# Patient Record
Sex: FEMALE | Race: WHITE | Employment: FULL TIME | URBAN - METROPOLITAN AREA
[De-identification: names, ages, dates, MRNs, and addresses within clinical notes are randomized per-mention and may not be internally consistent; named-entity substitution may affect disease eponyms.]

---

## 2017-02-22 ENCOUNTER — HOSPITAL ENCOUNTER (OUTPATIENT)
Age: 37
Discharge: HOME | End: 2017-02-22
Attending: PSYCHIATRY & NEUROLOGY
Payer: COMMERCIAL

## 2017-02-22 ENCOUNTER — HOSPITAL ENCOUNTER (OUTPATIENT)
Dept: LAB | Age: 37
Discharge: HOME | End: 2017-02-22
Payer: COMMERCIAL

## 2017-02-22 DIAGNOSIS — G43.019 HEADACHE, COMMON MIGRAINE, INTRACTABLE: ICD-10-CM

## 2017-02-22 LAB
ERYTHROCYTE [SEDIMENTATION RATE] IN BLOOD: 44 MM/HR (ref 0–20)
FOLATE SERPL-MCNC: >20 NG/ML (ref 3.1–17.5)
TSH SERPL DL<=0.05 MIU/L-ACNC: 4.51 UIU/ML (ref 0.36–3.74)
VIT B12 SERPL-MCNC: 636 PG/ML (ref 211–911)

## 2017-02-22 PROCEDURE — 70551 MRI BRAIN STEM W/O DYE: CPT

## 2017-02-23 LAB
ACE SERPL-CCNC: 34 U/L (ref 14–82)
ANA TITR SER IF: NEGATIVE {TITER}
B BURGDOR IGG+IGM SER-ACNC: <0.91 ISR (ref 0–0.9)
T PALLIDUM AB SER QL IF: NON REACTIVE

## 2017-05-17 ENCOUNTER — DOCUMENTATION ONLY (OUTPATIENT)
Dept: SURGERY | Age: 37
End: 2017-05-17

## 2017-05-19 ENCOUNTER — OFFICE VISIT (OUTPATIENT)
Dept: SURGERY | Age: 37
End: 2017-05-19

## 2017-05-19 VITALS
HEIGHT: 68 IN | HEART RATE: 91 BPM | WEIGHT: 293 LBS | SYSTOLIC BLOOD PRESSURE: 136 MMHG | DIASTOLIC BLOOD PRESSURE: 84 MMHG | BODY MASS INDEX: 44.41 KG/M2 | RESPIRATION RATE: 20 BRPM | TEMPERATURE: 97.6 F

## 2017-05-19 DIAGNOSIS — R12 HEARTBURN: ICD-10-CM

## 2017-05-19 DIAGNOSIS — G93.2 PSEUDOTUMOR CEREBRI: ICD-10-CM

## 2017-05-19 DIAGNOSIS — E66.01 MORBID OBESITY DUE TO EXCESS CALORIES (HCC): Primary | ICD-10-CM

## 2017-05-19 RX ORDER — BISMUTH SUBSALICYLATE 262 MG
1 TABLET,CHEWABLE ORAL DAILY
COMMUNITY

## 2017-05-19 RX ORDER — ACETAZOLAMIDE 500 MG/1
CAPSULE, EXTENDED RELEASE ORAL
COMMUNITY
Start: 2017-05-04 | End: 2017-11-08 | Stop reason: ALTCHOICE

## 2017-05-19 RX ORDER — PREDNISOLONE ACETATE 10 MG/ML
SUSPENSION/ DROPS OPHTHALMIC
COMMUNITY
Start: 2017-05-10 | End: 2017-11-08 | Stop reason: ALTCHOICE

## 2017-05-19 RX ORDER — NABUMETONE 500 MG/1
TABLET, FILM COATED ORAL
COMMUNITY
Start: 2017-05-08 | End: 2017-11-08 | Stop reason: ALTCHOICE

## 2017-05-19 NOTE — PROGRESS NOTES
Pt ID confirmed    Weight Loss Metrics 5/19/2017 5/19/2017   Pre op / Initial Wt 397 -   Today's Wt - 397 lb   BMI - 60.36 kg/m2   Ideal Body Wt 143 -   Excess Body Wt 254 -   Goal Wt 194 -   Wt loss to date 0 -   % Wt Loss 0 -   80% .2 -       Body mass index is 60.36 kg/(m^2).

## 2017-05-19 NOTE — PROGRESS NOTES
Initial Consultation for Bariatric Surgery     Alex Taylor is a 39 y.o. female who comes into the office today for initial consultation for the surgical options for the treatment of morbid obesity. The patient initially identified obesity in childhood. Alex Taylor has tried a variety of unsupervised weight-loss attempts including unsupervised diets, Weight Watchers and shuakes, but has yet to meet with lasting success. Maximum weight lost on a diet is about 80 lbs, but that the weight always seems to return. Today, the patient is Height: 5' 8\" (172.7 cm) , Weight: (!) 180.1 kg (397 lb) for a BMI of Body mass index is 60.36 kg/(m^2). Renee Long Maximum weight is 430lbs. It is due to their severe obesity, which is further complicated by  GERD and pseudotumor cerebri and GERD that the patient is now seeking out bariatric surgery. Weight Loss Metrics 5/19/2017 5/19/2017   Pre op / Initial Wt 397 -   Today's Wt - 397 lb   BMI - 60.36 kg/m2   Ideal Body Wt 143 -   Excess Body Wt 254 -   Goal Wt 194 -   Wt loss to date 0 -   % Wt Loss 0 -   80% .2 -       Body mass index is 60.36 kg/(m^2). Past Medical History:   Diagnosis Date    Arthritis     Pseudotumor cerebri        Past Surgical History:   Procedure Laterality Date    HX APPENDECTOMY      HX HEENT      T and A       Current Outpatient Prescriptions   Medication Sig Dispense Refill    nabumetone (RELAFEN) 500 mg tablet       prednisoLONE acetate (PRED FORTE) 1 % ophthalmic suspension       acetaZOLAMIDE SR (DIAMOX) 500 mg capsule       MEDROXYPROGESTERONE ACETATE (DEPO-PROVERA IM) by IntraMUSCular route.  Cetirizine (ZYRTEC) 10 mg cap Take  by mouth.  multivitamin (ONE A DAY) tablet Take 1 Tab by mouth daily.  calcium-cholecalciferol, d3, (CALCIUM 600 + D) 600-125 mg-unit tab Take  by mouth.          Allergies   Allergen Reactions    Tetracycline Other (comments)     Increases symptoms of pseudo tumor       Social History Substance Use Topics    Smoking status: Former Smoker     Quit date: 2/19/2017    Smokeless tobacco: None    Alcohol use Yes      Comment: socially   smokes on occasion, last one in February    No family history on file.     ROS, positive where in bold:    General: fevers, chills, night sweats, fatigue, weight loss, weight gain    GI: abdominal pain, nausea, vomiting, change in bowel habits, hematochezia, melena, GERD, constipation    Integumentary: dermatitis or abnormal moles, rashes and swelling of pretibial area, rosacea    HEENT:  visual changes (under care of Dr Darius Mccormack for possible autoimmune disorder), floaters/ blurriness, vertigo, epistaxis, dysphagia, hoarseness    Cardiac: chest pain, palpitations, hypertension, edema, varicosities    Resp:  cough, shortness of breath, wheezing, hemoptysis, snoring, reactive airway disease    : hematuria, dysuria, frequency, urgency, nocturia, stress urinary incontinence    MSK: weakness, joint pain, arthritis    Endocrine: diabetes, thyroid disease, polyuria, polydipsia, polyphagia, poor wound healing, heat intolerance,cold intolerance    Lymph/Heme: anemia, bruising, history of blood transfusions    Neuro: dizziness, headache, fainting, seizures, stroke, pseudotumor cerebri    Psychiatry:  anxiety, depression, psychosis      Physical Exam:  Visit Vitals    /84    Pulse 91    Temp 97.6 °F (36.4 °C)    Resp 20    Ht 5' 8\" (1.727 m)    Wt (!) 180.1 kg (397 lb)    BMI 60.36 kg/m2       General: Well developed, well nourished 39 y.o. female in no acute distress  ENMT: normocephalic, atraumatic mouth:clear, no overt lesions, oral mucosa pink and moist  Neck: supple, no masses, no adenopathy or carotid bruits, trachea midline  Skin: warm, smooth, dry and well perfused  Respiratory: clear to auscultation bilaterally, no wheeze, rhonchi or rales, excursions normal and symmetrical  Cardiovascular: Regular rate and rhythm, no murmurs, clicks, gallops or rubs, no edema or varicosities  Gastrointestinal: soft, nontender, nondistended, normoactive bowel sounds, no hernias, no hepatosplenomegaly, minimally palpable costal margins, mixed distribution  Musculoskeletal: warm, well-perfused, no tenderness or swelling, normal gait/station  Neuro: sensation and strength grossly intact and symmetrical  Psych: alert and oriented to person, place and time      Impression:    Kole Bailey is a 39 y.o. female who is suffering from morbid obesity and its attendant comorbidities who would benefit from bariatric surgery. We have had an extensive discussion with regard to the risks, benefits and likely outcomes of both the sleeve and the gastric bypass. With regard to bypass, we have discussed the risks including bleeding, infection, need for reoperation, damage to surrounding structure, anastomotic leak, gastrogastric fistula ulcer and stricture, bowel obstruction due to internal hernia or adhesions, DVT, PE, heart attack, stroke and death. Patient also understands risks of inadequate weight loss, excess weight loss, vitamin insufficiency, protein malnutrition, excess skin, and loss of hair. We've discussed the restrictive nature of the sleeve gastrectomy. The patient understands the likelihood of losing approximately 65% of their excess weight in 12 to 18 months. Patient also understands the risks including but not limited to bleeding, infection, need for reoperation, leaks from staple line and strictures, bowel obstruction secondary to adhesions, DVT, PE, heart attack, stroke, and death. Patient also understands risks of inadequate weight loss, excess weight loss, vitamin insufficiency, protein malnutrition, excess skin, and loss of hair.          We have reviewed the components of a successful postoperative course including requirement for a high protein, low carbohydrate diet, 60 oz a day of zero calorie liquids, daily vitamin supplementation, daily exercise, regular follow-up, and participation in support groups. At this time we will enroll the patient in our bariatric program, undertake routine laboratory and radiographic evaluation, EKG, evaluation by nutritionist as well as psychologist and pending their satisfactory completion of the preop evaluation, plan to perform either a sleeve gastrectomy or a gastric bypass.

## 2017-05-24 ENCOUNTER — TELEPHONE (OUTPATIENT)
Dept: SURGERY | Age: 37
End: 2017-05-24

## 2017-05-24 LAB — UREA BREATH TEST QL: NEGATIVE

## 2017-05-24 NOTE — TELEPHONE ENCOUNTER
Patient emailed stating she would prefer the 9:30 class at [facility] instead of the 10:30 classes at [facility]. I have added her to [facility] class on 6/7 at 9:30. Email copied below. From: Benjamín Organ [mailto:[email or web address]  > Sent: Monday, May 22, 2017 3:51 PM  > To: Stana Harada  > Subject: Re: #ExtMail# Nutrition Classes  >   > Jessica President, whatever 9:30 classes you can do would be awesome, the venue doesn't matter. The only week I won't be in town is June26-30, so any other dates would be great. I really appreciate your help with this matter!  >   > -Alla  >   > Sent from my iPhone    On May 23, 2017, at 11:06 AM, Stana Harada [email or web address] wrote:  >   > Good morning,  >   > The 9:30 class is Wednesday 6/7 at the [facility] office. Please let me know if this works and I will cancel the classes scheduled at [facility].  >   > Thanks,  > Sunny Gonzalez    -----Original Message-----  From: Benjamín Organ [mailto:[email or web address]   Sent: Tuesday, May 23, 2017 3:22 PM  To: Stana Harada  Subject: Re: #ExtMail# Nutrition Classes    Perfect! Thank you so much!     Sincerely,  Pablo Walters from my iPhone

## 2017-06-06 DIAGNOSIS — E66.01 MORBID OBESITY WITH BMI OF 60.0-69.9, ADULT (HCC): Primary | ICD-10-CM

## 2017-06-07 ENCOUNTER — DOCUMENTATION ONLY (OUTPATIENT)
Dept: BARIATRICS/WEIGHT MGMT | Age: 37
End: 2017-06-07

## 2017-06-07 ENCOUNTER — HOSPITAL ENCOUNTER (OUTPATIENT)
Dept: BARIATRICS/WEIGHT MGMT | Age: 37
Discharge: HOME | End: 2017-06-07

## 2017-06-07 NOTE — PROGRESS NOTES
[address] [place] [facility] [address]    Patient's Name: Clifton Figueroa   Age: 39 y.o. YOB: 1980   Sex: female    Date:   6/7/2017    Insurance:            Session: 1 of 6  Revision:   Surgeon:  Dr. Chris Clarke    Height: 5 f 8 Weight:    395      Lbs. BMI: 60.2   Pounds Lost since last month: 2               Pounds Gained since last month: 0    Starting Weight: 397   Previous Months Weight: 397  Overall Pounds Lost: 2 Overall Pounds Gained: 0      Do you smoke? NOne    Alcohol intake:  Number of drinks at a time:  1  Number of times a month: 1-2    Class Guidelines    Guidelines are reviewed with patient at the start of every class. 1. Patient understands that weight loss trial classes must be consecutive. Patient understands if they miss a class, it is their responsibility to contact me to reschedule class. I will reach out to patient after their first no show. 2.  Patient understands the expectations that weight maintenance/weight loss is expected during the classes. Failure to demonstrate changes may result in one extra month of weight loss trial, followed by going back to see the surgeon. 3. Patient is also instructed to be doing their labs, blood work, psych visit, support group and any other test that the surgeon has used while they are working on their weight loss trial.    Other Pertinent Information:     Changes Made Since Last Class: Lessn  Alcohol. Trying to eat more times per day. Eating Habits and Behaviors      Today we spent some time talking about the key diet principles. Patient was given ideas of protein-based snacks including deli meat, low fat cheese, yogurt, hummus and vegetables, protein drink, or a small handful of nuts. Patient was instructed to start cutting out the empty carbohydrate-based snacks that include chips, cookies, pretzels, crackers.     We talked about carbohydrates and starting to count daily carbohydrate intake to keep less than 100 grams per day. Patient is encouraged to fill up on meat and vegetables only. Patient was given a goal of 64 ounces of fluid a day. This needs to be non-carbonated, no caffeine, and no sugary drinks. Patient's current diet habits include: 2-3 meals per day. States she is rarely snacking. She is eating bread 3-4 x a week. She is eating sushi weekly. She states she rarely eats cookies, cake, pasta. She states she is struggling with a high carbohydrate diet. She is eating baked and grilled food. She is drinking 6-90 ounces of fluid per day. She is drinking 1-2 x a week, 32 ounces of sweet tea, but understands that is something she needs to cut out. Physical Activity/Exercise    Comments:  During class, I discussed with patient the importance of getting into an exercise routine. We talked about how strength training can help one's metabolism  Currently, patient is not doing anything for activity, states she is having knees issues. .  Goals have been set. I have encouraged patient to purchase a pedometer to track their steps. Behavior Modification       Comments: In class, I did a power point on The 100-200 Mindless Margin. Studies show that the average person will not know if they ate 100 or 200 more or less calories than usual.  This is called the Mindless Margin. In other words, one can eat up to 20% fewer calories and not activate the deprivation and craving mechanism in the brain. Patient was instructed to make a modest daily 100-200 calorie reduction in certain things that the body won't notice. One, 100-200 calorie change and you will lose 10-20 pounds in a year. We talked about strategies that may help including Food rules: I will not eat the whole dessert, but rather just 4 bites to satisfy me (pre-op).       Patient was given a check off list with a month's worth of days across the top and was encouraged to come up with a food, exercise, and behavior goal.  Every evening if the goal was achieved, they get a check in the box. The goal is for it to be filled with check marks.       One 100-calorie change that the patient is going to make is: Eating 3 x a day    Maddison Macario Spencer 87 RD  6/7/2017

## 2017-06-13 ENCOUNTER — HOSPITAL ENCOUNTER (OUTPATIENT)
Dept: LAB | Age: 37
Discharge: HOME | End: 2017-06-13
Payer: COMMERCIAL

## 2017-06-13 ENCOUNTER — HOSPITAL ENCOUNTER (OUTPATIENT)
Dept: GENERAL RADIOLOGY | Age: 37
Discharge: HOME | End: 2017-06-13
Payer: COMMERCIAL

## 2017-06-13 DIAGNOSIS — E66.01 MORBID OBESITY WITH BMI OF 60.0-69.9, ADULT (HCC): ICD-10-CM

## 2017-06-13 DIAGNOSIS — H30.92 LEFT POSTERIOR UVEITIS: ICD-10-CM

## 2017-06-13 LAB
ATRIAL RATE: 79 BPM
CALCULATED P AXIS, ECG09: 6 DEGREES
CALCULATED R AXIS, ECG10: 49 DEGREES
CALCULATED T AXIS, ECG11: 11 DEGREES
DIAGNOSIS, 93000: NORMAL
P-R INTERVAL, ECG05: 152 MS
Q-T INTERVAL, ECG07: 404 MS
QRS DURATION, ECG06: 102 MS
QTC CALCULATION (BEZET), ECG08: 463 MS
VENTRICULAR RATE, ECG03: 79 BPM

## 2017-06-13 PROCEDURE — 71020 XR CHEST PA LAT: CPT

## 2017-06-16 ENCOUNTER — TELEPHONE (OUTPATIENT)
Dept: SURGERY | Age: 37
End: 2017-06-16

## 2017-07-13 ENCOUNTER — HOSPITAL ENCOUNTER (OUTPATIENT)
Dept: BARIATRICS/WEIGHT MGMT | Age: 37
Discharge: HOME | End: 2017-07-13

## 2017-07-13 ENCOUNTER — DOCUMENTATION ONLY (OUTPATIENT)
Dept: BARIATRICS/WEIGHT MGMT | Age: 37
End: 2017-07-13

## 2017-07-13 NOTE — PROGRESS NOTES
[address] [facility] [address], [address]    Patient's Name: Alonso Schroeder   Age: 39 y.o. YOB: 1980   Sex: female    Date:   7/13/2017    Insurance:            Session: 2 of 6  Revision:   Surgeon:  Dr. Medina Dockery    Height: 5 f 8 Weight:    393      Lbs. BMI: 59.8   Pounds Lost since last month: 2               Pounds Gained since last month: 0    Starting Weight: 397   Previous Months Weight: 395  Overall Pounds Lost: 4 Overall Pounds Gained: 0      Do you smoke? NOne    Alcohol intake:  Number of drinks at a time:  1-2  Number of times a week:  < 2    Class Guidelines    Guidelines are reviewed with patient at the start of every class. 1. Patient understands that weight loss trial classes must be consecutive. Patient understands if they miss a class, it is their responsibility to contact me to reschedule class. I will reach out to patient after their first no show. 2.  Patient understands the expectations that weight maintenance/weight loss is expected during the classes. Failure to demonstrate changes may result in one extra month of weight loss trial, followed by going back to see the surgeon. 3. Patient is also instructed to be doing their labs, blood work, psych visit, support group and any other test that the surgeon has used while they are working on their weight loss trial.    Other Pertinent Information:     Changes Made Since Last Class: Protein shake daily. 2-3 meals per day. Eliminated sugary coffee drinks. Eating Habits and Behaviors      Today we reviewed key diet principles. Tips when eating out was discussed with patient including avoiding sandwiches to help reduce the carbohydrate intake, avoid drinking liquid calories, and try to split an entree. We talked about limiting the frequency of eating out. We also talked about carbohydrates.   Patient was encouraged to keep their carbohydrates under 100 grams while in weight loss trial classes and try to focus on meat and vegetables only. Patient was also encouraged to aim for 64 ounces of fluid per day without any liquid calories. Patient's current diet habits include: 2-3 meals per day. Snacking on string cheese and greek yogurt. She states she is trying to watch her carbohydrate intake and may have pretzels 2-3 x a week. She is drinking 16-32 ounces of fluid per day. She was encouraged to keep sipping to work towards 64 ounces per day. Physical Activity/Exercise    Comments:  During class, I gave a presentation called the Power of Walking. This presentation discussed all the benefits of walking, including decreasing the risk of developing diabetes by 30%. Patient was also made aware that losing 10 pounds of weight can relieve 40 pounds of pressure on the knees. Patient was encouraged to purchase a pedometer and use that as a tracker to increase their steps. The goals is ultimately 10,000 steps per day, but working towards that or using a pedometer to challenge themselves is a great self-motivator. Currently for exercise, patient is doing a 10 minute walk per day, 3-5 x a week. Behavior Modification       Comments: In class, we also focused on some behavior modifications. These include not eating in front of the TV, which sometimes leads to mindless munching. Shared with patients the study about people who were given 15 day old popcorn and despite the popcorn being 15days old, participants still ate it all because they were distracted by the movie and were not listening to physical cues with their body. I have also encouraged patient to start using a baby spoon or fork to eat their meals to help slow down the pace.       Maddison Bennett 87 RD  7/13/2017

## 2017-07-30 ENCOUNTER — HOSPITAL ENCOUNTER (EMERGENCY)
Age: 37
Discharge: HOME | End: 2017-07-30
Attending: EMERGENCY MEDICINE
Payer: COMMERCIAL

## 2017-07-30 VITALS
DIASTOLIC BLOOD PRESSURE: 98 MMHG | TEMPERATURE: 98.9 F | HEART RATE: 88 BPM | BODY MASS INDEX: 43.4 KG/M2 | RESPIRATION RATE: 20 BRPM | WEIGHT: 293 LBS | OXYGEN SATURATION: 96 % | SYSTOLIC BLOOD PRESSURE: 147 MMHG | HEIGHT: 69 IN

## 2017-07-30 DIAGNOSIS — R03.0 ELEVATED BLOOD PRESSURE READING: ICD-10-CM

## 2017-07-30 DIAGNOSIS — S61.219A LACERATION OF FINGER, INITIAL ENCOUNTER: Primary | ICD-10-CM

## 2017-07-30 PROCEDURE — 75810000293 HC SIMP/SUPERF WND  RPR

## 2017-07-30 PROCEDURE — 99281 EMR DPT VST MAYX REQ PHY/QHP: CPT

## 2017-07-30 PROCEDURE — 77030031132 HC SUT NYL COVD -A

## 2017-07-30 PROCEDURE — 77030018836 HC SOL IRR NACL ICUM -A

## 2017-07-30 NOTE — ED PROVIDER NOTES
HPI Comments: Patient is a 40 y/o female who presents to the ER c/o laceration to the right index finger. Patient states she was drying her dishes, when she cut her finger with a knife. She is up to date on tetanus. She is right hand dominant. Patient states she was unable to stop the bleeding with direct pressure after 20 minutes. She denied any anticoagulation meds. No other symptoms or complaints. The history is provided by the patient. Past Medical History:   Diagnosis Date    Arthritis     Pseudotumor cerebri        Past Surgical History:   Procedure Laterality Date    HX APPENDECTOMY      HX HEENT      T and A         History reviewed. No pertinent family history. Social History     Social History    Marital status: UNKNOWN     Spouse name: N/A    Number of children: N/A    Years of education: N/A     Occupational History    Not on file. Social History Main Topics    Smoking status: Former Smoker     Quit date: 2/19/2017    Smokeless tobacco: Never Used    Alcohol use Yes      Comment: socially    Drug use: No    Sexual activity: Not on file     Other Topics Concern    Not on file     Social History Narrative         ALLERGIES: Tetracycline    Review of Systems   Constitutional: Negative for chills, fatigue and fever. HENT: Negative. Negative for sore throat. Eyes: Negative. Respiratory: Negative for cough and shortness of breath. Cardiovascular: Negative for chest pain and palpitations. Gastrointestinal: Negative for abdominal pain, nausea and vomiting. Genitourinary: Negative for dysuria. Musculoskeletal: Negative. Skin: Positive for wound. Index to right index   Neurological: Negative for dizziness, weakness, light-headedness and headaches. Psychiatric/Behavioral: Negative. All other systems reviewed and are negative.       Vitals:    07/30/17 1904   BP: (!) 147/98   Pulse: 88   Resp: 20   Temp: 98.9 °F (37.2 °C)   SpO2: 96%   Weight: (!) 179.2 kg (395 lb)   Height: 5' 9\" (1.753 m)            Physical Exam   Constitutional: She is oriented to person, place, and time. She appears well-developed and well-nourished. No distress. HENT:   Head: Normocephalic and atraumatic. Mouth/Throat: Oropharynx is clear and moist.   Eyes: Conjunctivae are normal. No scleral icterus. Neck: Neck supple. No JVD present. No tracheal deviation present. Cardiovascular: Normal rate, regular rhythm and normal heart sounds. Pulmonary/Chest: Effort normal and breath sounds normal. No respiratory distress. She has no wheezes. Abdominal: Soft. There is no tenderness. Musculoskeletal: Normal range of motion. Hands:  Neurological: She is alert and oriented to person, place, and time. She has normal strength. Gait normal. GCS eye subscore is 4. GCS verbal subscore is 5. GCS motor subscore is 6. Skin: Skin is warm and dry. She is not diaphoretic. Psychiatric: She has a normal mood and affect. Nursing note and vitals reviewed. MDM  Number of Diagnoses or Management Options  Diagnosis management comments: 7:03 PM  40 y/o female c/o right index finger laceration onset prior to coming to the ER. Will plan on cleansing wound and appropriate closure. Daniel Ba PA-C    7:26 PM  Pt tolerated wound closure well. Band aid placed. Advised pt to return in 10-12 days for suture removal.  All questions answered and patient in agreement with plan of care. Will plan for discharge. Daniel Ba PA-C    Clinical Impression:  Right index finger laceration, elevated blood pressure reading    One or more blood pressure readings were noted elevated during the Pt's presentation in the emergency department this date. This abnormal reading has been cited in the Pt's diagnosis, and they have been encouraged to follow up with their primary care physician, or referred to a consultant for further evaluation and treatment.        Risk of Complications, Morbidity, and/or Mortality  Presenting problems: low  Diagnostic procedures: low  Management options: low    Patient Progress  Patient progress: stable    ED Course       Wound Repair  Date/Time: 7/30/2017 7:24 PM  Performed by: [id number] provider: igor  Preparation: skin prepped with Shur-Clens  Pre-procedure re-eval: Immediately prior to the procedure, the patient was reevaluated and found suitable for the planned procedure and any planned medications. Time out: Immediately prior to the procedure a time out was called to verify the correct patient, procedure, equipment, staff and marking as appropriate. .  Location details: right index finger  Wound length:2.5 cm or less (2.5)  Anesthesia: local infiltration    Anesthesia:  Anesthesia: local infiltration  Local Anesthetic: lidocaine 1% without epinephrine   Anesthetic total: 2 mL  Foreign bodies: no foreign bodies  Skin closure: 4-0 nylon  Number of sutures: 2  Technique: simple  Approximation: close  Dressing: band aid. Patient tolerance: Patient tolerated the procedure well with no immediate complications  My total time at bedside, performing this procedure was 1-15 minutes. Vitals:  Patient Vitals for the past 12 hrs:   Temp Pulse Resp BP SpO2   07/30/17 1904 98.9 °F (37.2 °C) 88 20 (!) 147/98 96 %         Medications ordered:   Medications - No data to display      Lab findings:  No results found for this or any previous visit (from the past 12 hour(s)). EKG interpretation by ED Physician:      X-Ray, CT or other radiology findings or impressions:  No orders to display       Progress notes, Consult notes or additional Procedure notes:       Reevaluation of patient:       Disposition:  Diagnosis:   1. Laceration of finger, initial encounter    2.  Elevated blood pressure reading        Disposition: Discharged    Follow-up Information     Follow up With Details Comments [facility]  If symptoms worsen [address] [facility] [address] [id number]  [address] [facility] EMERGENCY DEPT In 10 days For suture removal [place] [id number] [phone]           Patient's Medications   Start Taking    No medications on file   Continue Taking    ACETAZOLAMIDE SR (DIAMOX) 500 MG CAPSULE        CALCIUM-CHOLECALCIFEROL, D3, (CALCIUM 600 + D) 600-125 MG-UNIT TAB    Take  by mouth. CETIRIZINE (ZYRTEC) 10 MG CAP    Take  by mouth. MEDROXYPROGESTERONE ACETATE (DEPO-PROVERA IM)    by IntraMUSCular route. MULTIVITAMIN (ONE A DAY) TABLET    Take 1 Tab by mouth daily.     NABUMETONE (RELAFEN) 500 MG TABLET        PREDNISOLONE ACETATE (PRED FORTE) 1 % OPHTHALMIC SUSPENSION       These Medications have changed    No medications on file   Stop Taking    No medications on file

## 2017-07-30 NOTE — DISCHARGE INSTRUCTIONS

## 2017-08-02 ENCOUNTER — HOSPITAL ENCOUNTER (OUTPATIENT)
Dept: LAB | Age: 37
Discharge: HOME | End: 2017-08-02
Payer: COMMERCIAL

## 2017-08-02 DIAGNOSIS — E66.01 MORBID OBESITY WITH BMI OF 60.0-69.9, ADULT (HCC): ICD-10-CM

## 2017-08-02 LAB
ALBUMIN SERPL BCP-MCNC: 3.4 G/DL (ref 3.4–5)
ALBUMIN/GLOB SERPL: 1 {RATIO} (ref 0.8–1.7)
ALP SERPL-CCNC: 77 U/L (ref 45–117)
ALT SERPL-CCNC: 19 U/L (ref 13–56)
ANION GAP BLD CALC-SCNC: 9 MMOL/L (ref 3–18)
AST SERPL W P-5'-P-CCNC: 11 U/L (ref 15–37)
BASOPHILS # BLD AUTO: 0 K/UL (ref 0–0.06)
BASOPHILS # BLD: 0 % (ref 0–2)
BILIRUB SERPL-MCNC: 0.3 MG/DL (ref 0.2–1)
BUN SERPL-MCNC: 19 MG/DL (ref 7–18)
BUN/CREAT SERPL: 32 (ref 12–20)
CALCIUM SERPL-MCNC: 8.6 MG/DL (ref 8.5–10.1)
CHLORIDE SERPL-SCNC: 109 MMOL/L (ref 100–108)
CHOLEST SERPL-MCNC: 135 MG/DL
CO2 SERPL-SCNC: 23 MMOL/L (ref 21–32)
CREAT SERPL-MCNC: 0.59 MG/DL (ref 0.6–1.3)
DIFFERENTIAL METHOD BLD: ABNORMAL
EOSINOPHIL # BLD: 0.2 K/UL (ref 0–0.4)
EOSINOPHIL NFR BLD: 2 % (ref 0–5)
ERYTHROCYTE [DISTWIDTH] IN BLOOD BY AUTOMATED COUNT: 14.7 % (ref 11.6–14.5)
EST. AVERAGE GLUCOSE BLD GHB EST-MCNC: NORMAL MG/DL
FERRITIN SERPL-MCNC: 66 NG/ML (ref 8–388)
FOLATE SERPL-MCNC: 18.6 NG/ML (ref 3.1–17.5)
GLOBULIN SER CALC-MCNC: 3.3 G/DL (ref 2–4)
GLUCOSE SERPL-MCNC: 87 MG/DL (ref 74–99)
HBA1C MFR BLD: 4.9 % (ref 4.2–5.6)
HCT VFR BLD AUTO: 37.5 % (ref 35–45)
HDLC SERPL-MCNC: 37 MG/DL (ref 40–60)
HDLC SERPL: 3.6 {RATIO} (ref 0–5)
HGB BLD-MCNC: 12.1 G/DL (ref 12–16)
IRON SERPL-MCNC: 28 UG/DL (ref 50–175)
LDLC SERPL CALC-MCNC: 81.6 MG/DL (ref 0–100)
LIPID PROFILE,FLP: ABNORMAL
LYMPHOCYTES # BLD AUTO: 20 % (ref 21–52)
LYMPHOCYTES # BLD: 2 K/UL (ref 0.9–3.6)
MCH RBC QN AUTO: 28.1 PG (ref 24–34)
MCHC RBC AUTO-ENTMCNC: 32.3 G/DL (ref 31–37)
MCV RBC AUTO: 87 FL (ref 74–97)
MONOCYTES # BLD: 0.5 K/UL (ref 0.05–1.2)
MONOCYTES NFR BLD AUTO: 5 % (ref 3–10)
NEUTS SEG # BLD: 7.4 K/UL (ref 1.8–8)
NEUTS SEG NFR BLD AUTO: 73 % (ref 40–73)
PLATELET # BLD AUTO: 273 K/UL (ref 135–420)
PMV BLD AUTO: 10.7 FL (ref 9.2–11.8)
POTASSIUM SERPL-SCNC: 4.1 MMOL/L (ref 3.5–5.5)
PROT SERPL-MCNC: 6.7 G/DL (ref 6.4–8.2)
RBC # BLD AUTO: 4.31 M/UL (ref 4.2–5.3)
SODIUM SERPL-SCNC: 141 MMOL/L (ref 136–145)
TRIGL SERPL-MCNC: 82 MG/DL (ref ?–150)
TSH SERPL DL<=0.05 MIU/L-ACNC: 3.73 UIU/ML (ref 0.36–3.74)
VIT B12 SERPL-MCNC: 569 PG/ML (ref 211–911)
VLDLC SERPL CALC-MCNC: 16.4 MG/DL
WBC # BLD AUTO: 10.1 K/UL (ref 4.6–13.2)

## 2017-08-02 PROCEDURE — 82728 ASSAY OF FERRITIN: CPT | Performed by: SURGERY

## 2017-08-02 PROCEDURE — 80053 COMPREHEN METABOLIC PANEL: CPT | Performed by: SURGERY

## 2017-08-02 PROCEDURE — 84443 ASSAY THYROID STIM HORMONE: CPT | Performed by: SURGERY

## 2017-08-02 PROCEDURE — 82306 VITAMIN D 25 HYDROXY: CPT | Performed by: SURGERY

## 2017-08-02 PROCEDURE — 80061 LIPID PANEL: CPT | Performed by: SURGERY

## 2017-08-02 PROCEDURE — 85025 COMPLETE CBC W/AUTO DIFF WBC: CPT | Performed by: SURGERY

## 2017-08-02 PROCEDURE — 82607 VITAMIN B-12: CPT | Performed by: SURGERY

## 2017-08-02 PROCEDURE — 84425 ASSAY OF VITAMIN B-1: CPT | Performed by: SURGERY

## 2017-08-02 PROCEDURE — 83540 ASSAY OF IRON: CPT | Performed by: SURGERY

## 2017-08-02 PROCEDURE — 83036 HEMOGLOBIN GLYCOSYLATED A1C: CPT | Performed by: SURGERY

## 2017-08-02 PROCEDURE — 36415 COLL VENOUS BLD VENIPUNCTURE: CPT | Performed by: SURGERY

## 2017-08-04 LAB — VIT B1 BLD-SCNC: 183.1 NMOL/L (ref 66.5–200)

## 2017-08-08 LAB
25(OH)D2 SERPL-MCNC: <1 NG/ML
25(OH)D3 SERPL-MCNC: 22 NG/ML
25(OH)D3+25(OH)D2 SERPL-MCNC: 22 NG/ML

## 2017-08-08 NOTE — PROGRESS NOTES
8/8/17:  Spoke to patient regarding her Vitamin D 3 levels. Patient understood the instructions of starting 5000 IU of Vitamin D 3 per day. I also spoke to patient regarding her iron levels. Normal . Patient's levels were 28. Patient was instructed to take 65 mg of iron BID with Vitamin C to help absorption. She was 3 more months with me in [facility]. Jesica, can you please put an order in [facility] for her to retest iron in 6-8 weeks. Thank you.     Jelani Bishop, MS, RD

## 2017-08-10 ENCOUNTER — OFFICE VISIT (OUTPATIENT)
Dept: SURGERY | Age: 37
End: 2017-08-10

## 2017-08-10 VITALS
SYSTOLIC BLOOD PRESSURE: 151 MMHG | WEIGHT: 293 LBS | RESPIRATION RATE: 20 BRPM | TEMPERATURE: 96.9 F | HEIGHT: 69 IN | BODY MASS INDEX: 43.4 KG/M2 | HEART RATE: 94 BPM | DIASTOLIC BLOOD PRESSURE: 98 MMHG

## 2017-08-10 DIAGNOSIS — E66.01 MORBID OBESITY DUE TO EXCESS CALORIES (HCC): Primary | ICD-10-CM

## 2017-08-10 RX ORDER — CHOLECALCIFEROL TAB 125 MCG (5000 UNIT) 125 MCG
TAB ORAL DAILY
COMMUNITY

## 2017-08-10 RX ORDER — LANOLIN ALCOHOL/MO/W.PET/CERES
CREAM (GRAM) TOPICAL
COMMUNITY
End: 2020-01-13 | Stop reason: ALTCHOICE

## 2017-08-10 NOTE — PROGRESS NOTES
Juliane Quigley is a 39 y.o. female who presents today with   Chief Complaint   Patient presents with    Morbid Obesity     Mid trial     Body mass index is 57.74 kg/(m^2). 1. Have you been to the ER, urgent care clinic since your last visit? Hospitalized since your last visit? No    2. Have you seen or consulted any other health care providers outside of the [address] [place] [address] since your last visit? Include any pap smears or colon screening.  No

## 2017-08-10 NOTE — PROGRESS NOTES
Bariatric Preoperative Progress note:    Subjective:     Nelsy Isaac is a 39 y.o. female who presents today for followup of their candidacy for bariatric surgery. Since last seen, has been working to cut carbs and eat breakfast.  She is also trying to walk 3-4 days a week. She notes that she is limited by her knee pain. She is packing her lunch and trying to cook more. She cut her finger cooking 10 days ago and had three stitches placed in her right index finger. She would like to have those removed today was well. She has decided she would like to pursue gastric bypass. Past Medical History:   Diagnosis Date    Arthritis     Pseudotumor cerebri        Past Surgical History:   Procedure Laterality Date    HX APPENDECTOMY      HX HEENT      T and A        Current Outpatient Prescriptions   Medication Sig Dispense Refill    cholecalciferol, VITAMIN D3, (VITAMIN D3) 5,000 unit tab tablet Take  by mouth daily.  ferrous sulfate (IRON) 325 mg (65 mg iron) tablet Take  by mouth Daily (before breakfast).  acetaZOLAMIDE SR (DIAMOX) 500 mg capsule       MEDROXYPROGESTERONE ACETATE (DEPO-PROVERA IM) by IntraMUSCular route.  multivitamin (ONE A DAY) tablet Take 1 Tab by mouth daily.  calcium-cholecalciferol, d3, (CALCIUM 600 + D) 600-125 mg-unit tab Take  by mouth.  nabumetone (RELAFEN) 500 mg tablet       prednisoLONE acetate (PRED FORTE) 1 % ophthalmic suspension       Cetirizine (ZYRTEC) 10 mg cap Take  by mouth.          Allergies   Allergen Reactions    Tetracycline Other (comments)     Increases symptoms of pseudo tumor         Objective:     Physical Exam:  Visit Vitals    BP (!) 151/98 (BP 1 Location: Right arm, BP Patient Position: At rest)    Pulse 94    Temp 96.9 °F (36.1 °C) (Oral)    Resp 20    Ht 5' 9\" (1.753 m)    Wt (!) 177.4 kg (391 lb)    BMI 57.74 kg/m2       General: Well developed, well nourished 39 y.o. female in no acute distress  ENMT: normocephalic, atraumatic mouth:clear, no overt lesions, oral mucosa pink and moist  Neck: supple, no masses, no adenopathy or carotid bruits, trachea midline  Skin: warm, smooth, dry and well perfused  Respiratory: clear to auscultation bilaterally, no wheezes, rhonchi or rales, excursions normal and symmetrical  Cardiovascular: Regular rate and rhythm, no murmurs, clicks, gallops or rubs, no edema or varicosities  Gastrointestinal: soft, nontender, nondistended, normoactive bowel sounds, no hernias, no hepatosplenomegaly  Musculoskeletal: warm, well-perfused, no tenderness or swelling, normal gait/station  Neuro: sensation and strength grossly intact and symmetrical  Psych: alert and oriented to person, place and time      Studies to date:    Labs: significant for low Vit D and iron, has been directed in supplementation    EKG: no significant abnormality    CXR: no significant abnormality    Nutritional evaluation: ongoing    Psychiatric evaluation:pending, re-evaluation scheduled for 8/18/17        Assessment:   Maldonado Post is a 39 y.o. female who is progressing through the bariatric preoperative evaluation. At this time, they are an appropriate candidate for weight loss surgery. Plan:   -complete remainder of preop evaluation including remaining dietary and psychological evaluations  -Follow up once has completed entirety of weight loss workup to determine next steps.

## 2017-08-11 DIAGNOSIS — E61.1 LOW IRON: Primary | ICD-10-CM

## 2017-08-16 ENCOUNTER — DOCUMENTATION ONLY (OUTPATIENT)
Dept: BARIATRICS/WEIGHT MGMT | Age: 37
End: 2017-08-16

## 2017-08-16 ENCOUNTER — HOSPITAL ENCOUNTER (OUTPATIENT)
Dept: BARIATRICS/WEIGHT MGMT | Age: 37
Discharge: HOME | End: 2017-08-16

## 2017-08-16 NOTE — PROGRESS NOTES
[address] [facility] [address], [address]    Patient's Name: Sherol Hatchet   Age: 39 y.o. YOB: 1980   Sex: female    Date:   8/16/2017    Insurance:            Session: 3 of  6  Revision:   Surgeon:  Dr. Deretha Frankel    Height: 5 f 8 Weight:    393      Lbs. BMI: 59.9   Pounds Lost since last month: 0               Pounds Gained since last month: 0    Starting Weight: 397   Previous Months Weight: 397  Overall Pounds Lost: 4 Overall Pounds Gained: 0      Do you smoke? None    Alcohol intake:  Number of drinks at a time:  1  Number of times a week: 1-2    Class Guidelines    Guidelines are reviewed with patient at the start of every class. 1. Patient understands that weight loss trial classes must be consecutive. Patient understands if they miss a class, it is their responsibility to contact me to reschedule class. I will reach out to patient after their first no show. 2.  Patient understands the expectations that weight maintenance/weight loss is expected during the classes. Failure to demonstrate changes may result in one extra month of weight loss trial, followed by going back to see the surgeon. 3. Patient is also instructed to be doing their labs, blood work, psych visit, support group and any other test that the surgeon has used while they are working on their weight loss trial.    Other Pertinent Information:     Changes Made Since Last Class: Meal preparing. 2.5 weeks of eating 100 grams of carbohydrates or less. Eating Habits and Behaviors      Today we reviewed fernandez diet principles. The main objective was focusing on carbohydrates. Patient was also given ideas of foods that are high in carbohydrates and foods that are lower in carbohydrates. Patient was instructed to stick to meat and vegetables only and try to keep their daily carbohydrate intake less than 100 grams per day.       During class, I also gave a power point on Water and Caffeine. In this presentation, we talked about the importance and how not getting enough fluid can impact their weight loss. Patient understands that 64 ounces is the minimum amount of fluid. We talked about the best sources of fluid choices. Patient was also given a list of common drinks and the amount of calories in these drinks, such as 250 calories in a large sweet teas, 300-500 calories in a smoothie, and 400 calories in some coffee drinks. We talked about signs and symptoms of dehydration. Patient was also given tips on how to wean from caffeine. Patient's current diet habits include: She states in the last 2 weeks, she has been eating out more than usual.  She states she has had a lot of meetings outside of work. Patient understands she needs to work on planning ahead of time, so she doesn't run into these situations that she is just grabbing something or eating out. Physical Activity/Exercise    Comments:     Currently for exercise, patient is walking 3-5 x a week. We talked about activities for patient to do, including walking, swimming, or chair exercises. Behavior Modification       Comments: In class, we also focused on some behavior modifications. Patient was instructed to aim for 3 meals a day and eating within 1 hour of waking up. Patient is encouraged not to go longer than 3-4 hours between meals, but if physical hunger occurs to go for a protein-based snack that we discussed. We also spent some time about the importance of planning ahead. Patient was encouraged to start thinking about what they were going to eat throughout the week for meals, so they can grocery shop, and be well-prepared. Lack of planning is often what creates havoc on a healthy lifestyle change. Assessment:    X  Due to the weight gain since last month, I spent some time talking with patient 1-1 before class.   Patient contributed some of the weight gain to eating out more. Some goals that we discussed include: She is going to make an effort to plan ahead.     Anh Londono, MS RD  8/16/2017

## 2017-09-14 ENCOUNTER — HOSPITAL ENCOUNTER (OUTPATIENT)
Dept: BARIATRICS/WEIGHT MGMT | Age: 37
Discharge: HOME | End: 2017-09-14

## 2017-09-14 ENCOUNTER — DOCUMENTATION ONLY (OUTPATIENT)
Dept: BARIATRICS/WEIGHT MGMT | Age: 37
End: 2017-09-14

## 2017-09-14 NOTE — PROGRESS NOTES
[address] [facility] [address] [facility], [address]    Patient's Name: Mladonado Post   Age: 39 y.o. YOB: 1980   Sex: female    Date:   9/14/2017    Insurance:            Session: 4 of 6  Revision:   Surgeon:  Dr. Filipe Whitlock    Height: 5 f 8 Weight:    395      Lbs. BMI: 60.2   Pounds Lost since last month: 0               Pounds Gained since last month: 2    Starting Weight: 397   Previous Months Weight: 393  Overall Pounds Lost: 2 Overall Pounds Gained: 0      Do you smoke? None    Alcohol intake:  Number of drinks at a time:  1  Number of times a week: < 1    Class Guidelines    Guidelines are reviewed with patient at the start of every class. 1. Patient understands that weight loss trial classes must be consecutive. Patient understands if they miss a class, it is their responsibility to contact me to reschedule class. I will reach out to patient after their first no show. 2.  Patient understands the expectations that weight maintenance/weight loss is expected during the classes. Failure to demonstrate changes may result in one extra month of weight loss trial, followed by going back to see the surgeon. 3. Patient is also instructed to be doing their labs, blood work, psych visit, support group and any other test that the surgeon has used while they are working on their weight loss trial.    Other Pertinent Information:     Changes Made Since Last Class: Sticking with low carb. Minimized drinking. Eating Habits and Behaviors      Today we reviewed fernandez diet principles. Patient was instructed to stop all liquid calories and was given a list of fluid choices that would be appropriate. We talked about carbohydrates. Patient was educated that all carbohydrates turn to sugar and was encouraged to stick to the complex carbohydrates while in weight loss trials, but aim to keep less than 100 grams during weight loss trials. Patient was given a list of foods to not eat and drink due to high sugar, high fat, or high carbohydrate content. Patient was also given a list of foods and drinks that are high protein, low carbohydrate, and would be appropriate to eat. Patient was given a list of fruit and what a portion size is and how many carbohydrates it contains. Patient was encouraged to keep fruit intake to a minimum. Patient was also given a list of 50 low carbohydrate snack options, but was also cautioned that some of the choices still were high in calories and portion control should be practiced. Patient's current diet habits include: 3 meals per day. States she does most of her snacking in the evening, but did not specify what she is snacking on. She is eating bread and rice 1 x a week. She is struggling the most with prepping her meals. She is drinking 64 ounces of fluid per day. She is eating baked and grilled food and is using a standard size plate. She has reduced her wine intake to 12 ounces per day. Physical Activity/Exercise    Comments:     Currently for exercise, patient is walking 15-20 minutes, 3-4 x a week. We talked about activities for patient to do, including walking, swimming, or chair exercises. Behavior Modification       Comments:   Patient was encouraged to food journal. I talked with patient about tracking their daily carbohydrate. One helpful isac is My Fitness Pal.  Patient was also encouraged to track their daily fluid intake. Discussed with patient the isac, Water Logged, that can be helpful in tracking their fluid intake. We also talked about the importance of planning ahead. Lack of willpower is often a lack of planning. Assessment:    Patient is getting frustrated and has been set up for a 1-1 next month.     Herb Frankel, Luite Spencer 87 RD  9/14/2017

## 2017-10-11 ENCOUNTER — HOSPITAL ENCOUNTER (OUTPATIENT)
Dept: BARIATRICS/WEIGHT MGMT | Age: 37
Discharge: HOME | End: 2017-10-11

## 2017-10-11 ENCOUNTER — DOCUMENTATION ONLY (OUTPATIENT)
Dept: BARIATRICS/WEIGHT MGMT | Age: 37
End: 2017-10-11

## 2017-10-11 NOTE — PROGRESS NOTES
Nutrition  Weight Loss Trial    Patient's Name: Hieu Sesay   Age: 39 y.o. YOB: 1980   Sex: female  Visit #: 5 of 6  Insurance:   Weight Loss Trial Insurance Required or Registered Dietitian Required:     Starting Weight with surgeon: 397  Height: 5 f 8 Weight: 390 BMI:        Other  Smoking Status:  None. Quit in February. Alcohol intake: Quit drinking last month. Changes made to prepare: No alcohol. Patient states she was previously drinking a lot of wine. She states she has been doing more meal prep on Saturday and Sundays. She states she is doing better about focusing on meal time and not eating on the go and eating in her car. She is now eating at a table. She states she has only eaten out one time. Dietary Instruction    Number of Meals Consumed per day: 3    Diet History: Gets up at 7:30 am.  She will eat on her way to work. She will do a protein shake. She will do little egg muffins if she is out of shakes. Patient states she will have a string cheese half way between her morning. She has established a new rule. She can't eat what she doesn't pack. Lunch:  Cucumber salad with tuna and light finley and Rg mustard or lentil salad. Previously, she may have gotten a salad, but she was eating out a lot. Sometimes between lunch-dinner:  May do a protein bar. She travels a lot for work. Dinner: Patient states she has been having an omelet with vegetables. Trigger Foods: Ice cream, pretzels, and wine. States she hasn't been buying them and is no longer drinking wine. Timing of Snacks: Protein bars, string cheese    Patient feels they have a good understanding of label reading: Focusing on carbohydrates     Patient's meal choices tend to be: Lower in carbohydrates, higher in protein    Preparation of patient's meals tend to be:  Baked and grilled     Dining Outside Home:  4-5 times in the last month, but this was closer to her last visit with me for fast food, 1 times a month for a sit down restaurant, and 1 times a month  for carry out. Other: This has improved significantly since the last time. Fluid Intake: 100 ounces of water per day, 0 ounces of  diet soda per day, 0 ounces of sweet tea per day, 0 ounces of fruit juices per day, 0 fruit smoothie frequency, 0 ounces of coffee, other:     Food Intake:  None this month for ice cream, 1 handful of MM's 2.5 weeks ago times a  for candy (hard, chocolate, etc), 0 times a  for cake, cookies, donuts, sweet rolls. Food Intake:  1 time in the last month for rice, 0 times a  for cereal, 0 times a for bread, 0 times a  for chips, pretzels, or other salty, crunchy snacks. Comments: She states she realized she wasn't being true to the program and really wasn't following the protocol. She states she has been planning her meals, stopped wine, and is no longer eating out. Physical Activity/Exercise      Comments: Patient states she is unpacking because she just moved. She is doing a lot of walking up and down her stairs. She states she hasn't been walking as much because she's been unpacking. Goals for Activity:  Once she is unpacked, she plans to go back to working. Behavior Modification    Emotional Eating is an issue for patient:  More out of boredom. She states she is trying to look for other outlets. Where does patient eat their meals: At a table    Patient is eating or snacking after 7 pm: Sometimes, but is trying to be more mindful if it's more hunger or boedom    Length of time to eat a meal: 15 minutes      Actions Plan:  Visit # 6    Goals for next month:    1. Continue with meal preparation   2. She feels that she is cutting her portions while doing the meal preparation. 3. Continue with walking for 30 minutes daily. 4. Continue with changes she has made.       Maddison Pandey Spencer 87 RD  10/11/2017

## 2017-10-30 ENCOUNTER — TELEPHONE (OUTPATIENT)
Dept: SURGERY | Age: 37
End: 2017-10-30

## 2017-10-30 NOTE — TELEPHONE ENCOUNTER
FIDENCIOM to schedule pt for a pre op appointment with Dr. Ingrid Augustin and a tentative surgery date for November.

## 2017-11-01 NOTE — TELEPHONE ENCOUNTER
Patient called back and she wanted to be scheduled at [facility] instead of [facility] and she wanted to be scheduled in [facility] December. She is scheduled for 12/11/17. She has her final wlt class with Gm Yeager next week, she has been scheduled for a pre op class at [facility].

## 2017-11-08 ENCOUNTER — OFFICE VISIT (OUTPATIENT)
Dept: SURGERY | Age: 37
End: 2017-11-08

## 2017-11-08 VITALS
TEMPERATURE: 98.6 F | RESPIRATION RATE: 20 BRPM | HEIGHT: 69 IN | BODY MASS INDEX: 43.4 KG/M2 | SYSTOLIC BLOOD PRESSURE: 136 MMHG | WEIGHT: 293 LBS | DIASTOLIC BLOOD PRESSURE: 80 MMHG | HEART RATE: 88 BPM

## 2017-11-08 DIAGNOSIS — E66.01 MORBID OBESITY DUE TO EXCESS CALORIES (HCC): Primary | ICD-10-CM

## 2017-11-08 NOTE — PROGRESS NOTES
Tacey Boas is a 39 y.o. female who comes into the office today after completing the entirety of the bariatric preoperative protocol satisfactorily. She has been struggling with obesity since childhood. She has tried a variety of unsupervised weight-loss attempts, but has yet to meet with lasting success. Today, the patient is Height: 5' 9\" (175.3 cm) tall, Weight: (!) 172.8 kg (381 lb) lbs for a Body mass index is 56.26 kg/(m^2). It is due to their severe obesity, which is further complicated by GERD and pseudotumor cerebri  that the patient is now seeking out bariatric surgery, specifically, the gastric bypass. Past Medical History:   Diagnosis Date    Arthritis     Pseudotumor cerebri        Past Surgical History:   Procedure Laterality Date    HX APPENDECTOMY      HX HEENT      T and A       Current Outpatient Prescriptions   Medication Sig Dispense Refill    cholecalciferol, VITAMIN D3, (VITAMIN D3) 5,000 unit tab tablet Take  by mouth daily.  ferrous sulfate (IRON) 325 mg (65 mg iron) tablet Take  by mouth Daily (before breakfast).  MEDROXYPROGESTERONE ACETATE (DEPO-PROVERA IM) by IntraMUSCular route.  Cetirizine (ZYRTEC) 10 mg cap Take  by mouth.  multivitamin (ONE A DAY) tablet Take 1 Tab by mouth daily.  calcium-cholecalciferol, d3, (CALCIUM 600 + D) 600-125 mg-unit tab Take  by mouth.  nabumetone (RELAFEN) 500 mg tablet       prednisoLONE acetate (PRED FORTE) 1 % ophthalmic suspension       acetaZOLAMIDE SR (DIAMOX) 500 mg capsule          Allergies   Allergen Reactions    Tetracycline Other (comments)     Increases symptoms of pseudo tumor       Social History   Substance Use Topics    Smoking status: Former Smoker     Quit date: 2/19/2017    Smokeless tobacco: Never Used    Alcohol use No       No family history on file.       ROS, positive where in bold:    General: fevers, chills, night sweats, fatigue, weight loss, weight gain    GI: abdominal pain, nausea, vomiting, change in bowel habits, hematochezia, melena, GERD    Integumentary: dermatitis or abnormal moles    HEENT:  visual changes, vertigo, epistaxis, dysphagia, hoarseness    Cardiac: chest pain, palpitations, hypertension, edema,  varicosities    Resp:  cough, shortness of breath, wheezing, hemoptysis, snoring, reactive airway disease    : hematuria, dysuria, frequency, urgency, nocturia, stress urinary incontinence    MSK: weakness, joint pain, arthritis    Endocrine: diabetes, thyroid disease, polyuria, polydipsia, polyphagia, poor wound healing, heat intolerance, cold intolerance    Lymph/Heme: anemia, bruising, history of blood transfusions    Neuro: dizziness, headache, fainting, seizures, stroke    Psychiatry:  Anxiety, depression, psychosis      Physical Exam:  Visit Vitals    /80    Pulse 88    Temp 98.6 °F (37 °C)    Resp 20    Ht 5' 9\" (1.753 m)    Wt (!) 172.8 kg (381 lb)    BMI 56.26 kg/m2       General: Well developed, well nourished 39 y.o. female in no acute distress  ENMT: normocephalic, atraumatic mouth:clear, no overt lesions, oral mucosa pink and moist  Neck: supple, no masses, no adenopathy or carotid bruits, trachea midline  Skin: warm, smooth, dry and well perfused  Respiratory: clear to auscultation bilaterally, no wheeze, rhonchi or rales, excursions normal and symmetrical  Cardiovascular: Regular rate and rhythm, no murmurs, clicks, gallops or rubs, no edema or varicosities  Gastrointestinal: soft, nontender, nondistended, normoactive bowel sounds, no hernias, no hepatosplenomegaly, minimally palpable costal margins,  mixed distribution  Musculoskeletal: warm, well-perfused, no tenderness or swelling, normal gait/station  Neuro: sensation and strength grossly intact and symmetrical  Psych: alert and oriented to person, place and time      Studies:    Labs: within normal limits except for low Vit D and iron (directed in supplementation)    EKG: without significant abnormalities    Nutritional evaluation has deemed a good candidate for weight loss surgery    Psychiatric evaluation has deemed a good candidate for weight loss surgery    Impression:    Luanne Singh is a 39 y.o. female who is suffering from morbid obesity and its attendant comorbidities who would benefit from bariatric surgery. We've discussed the restrictive and malabsorptive nature of the gastric bypass. The patient understands the likelihood of losing approximately 80% of their excess weight in 12 to 18 months. The patient also understands the risks including but not limited to bleeding, infection, need for reoperation, anastomotic ulcers, leaks and strictures, bowel obstruction secondary to adhesions and internal hernias, DVT, PE, heart attack, stroke, and death. Patient also understands risks of inadequate weight loss, excess weight loss, vitamin insufficiency, protein malnutrition, excess skin, and loss of hair. We have reviewed the components of a successful postoperative course including requirement for a high protein, low carbohydrate diet, 60 oz a day of zero calorie liquids, daily vitamin supplementation, daily exercise, regular follow-up, and participation in support groups. We have reviewed the preoperative clear liquid diet, as well as reviewed any medication changes required while on the clear liquid diet. We will schedule them for laparoscopic gastric bypass in the near future.

## 2017-11-08 NOTE — PROGRESS NOTES
Pt ID confirmed    Weight Loss Metrics 11/8/2017 11/8/2017 8/10/2017 8/10/2017 7/30/2017 5/19/2017 5/19/2017   Pre op / Initial Wt 381 - 391 - - 397 -   Today's Wt - 381 lb - 391 lb 395 lb - 397 lb   BMI - 56.26 kg/m2 - 57.74 kg/m2 58.33 kg/m2 - 60.36 kg/m2   Ideal Body Wt 146 - 146 - - 143 -   Excess Body Wt 235 - 245 - - 254 -   Goal Wt 193 - 195 - - 194 -   Wt loss to date 0 - 0 - - 0 -   % Wt Loss 0 - 0 - - 0 -   80%  - 196 - - 203.2 -       Body mass index is 56.26 kg/(m^2).

## 2017-11-09 ENCOUNTER — HOSPITAL ENCOUNTER (OUTPATIENT)
Dept: BARIATRICS/WEIGHT MGMT | Age: 37
Discharge: HOME | End: 2017-11-09

## 2017-11-09 ENCOUNTER — DOCUMENTATION ONLY (OUTPATIENT)
Dept: BARIATRICS/WEIGHT MGMT | Age: 37
End: 2017-11-09

## 2017-11-09 NOTE — PROGRESS NOTES
Nutrition Evaluation    Patient's Name: Hieu Sesay   Age: 39 y.o. YOB: 1980   Sex: female    Height: 5 f 8 Weight: 380 BMI:  57.9  Starting Weight:  397      Patient has completed a 6 month weight loss trial.  During the classes, we have focused on 3 components including diet, exercise, and behavior modifications. Upon completion of the weight loss trial, patient had a good understanding of the 3 components. The diet component of the weight loss trial emphasized on:   Label reading and keeping total fat and sugar less than 3 grams per serving    Proper portion sizes    Drinking 64 ounces of water per day   Cutting out simple sugar    The exercise component of the weight loss trial emphasized on:   The importance of getting into an exercise routine.  Ideas and goals for exercise were discussed with the patient. The behavior component of the weight loss trial emphasized on:   Taking 20-30 minutes to eat a meal.   Planning ahead to avoid making poor dietary choices.  Not eating in front of the TV or computer    Smoking Status:  None  Alcohol Intake:  Number of Drinks at a Time: NOne  Number of Times a Week: NOne    Changes made during classes include:  Packing and preparing meals  Less than 100 grams of carbohydrates per day  Eliminated alcohol        Two things that patient learned during this weight loss trial:  Dedication and Discipline is crucial  Major lifestyle change    Summary:  I feel that Hieu Sesay has demonstrated appropriate diet changes and is ready to move forward with surgery. Patient has been briefed on the importance of the protein drinks, vitamins, and the transition of the diet stages. Patient understands that the long-term diet will focus on protein and vegetables. Patient understand the effects of carbohydrates after surgery and what reactive hypoglycemia is.       Patient is aware that they will be attending pre-op class 2 weeks before surgery and will get more detailed information on the post-op diet guidelines. Patient will see me again at 6 weeks post-op. At this 6 week visit, RD will assess how patient is tolerating soft protein and advance to vegetables, if tolerating soft protein without difficulty. Patient will also see RD again at 9 months post-op. This visit will assess patient's compliance with current protocol, including diet, vitamins, protein shakes, and exercise. Post-op diet guidelines will be reinforced. RD is available for questions and to meet with patient outside of the 6 week and 9 month post-op visit.      Candidate for surgery: Yes  Re-evaluation Date:     Procedure:  Gastric Bypass     Dell Jeronimo MS RD  11/9/2017

## 2017-11-09 NOTE — PROGRESS NOTES
[address] [place] [facility] [address]    Patient's Name: Alice Warren   Age: 39 y.o. YOB: 1980   Sex: female    Date:   11/9/2017    Insurance:            Session: 6 of 6  Revision:   Surgeon:  Dr. Arvin Ricketts    Height: 5 f 8 Weight:    380      Lbs. BMI: 57.9   Pounds Lost since last month: 10               Pounds Gained since last month: 0    Starting Weight: 397   Previous Months Weight: 390  Overall Pounds Lost: 17 Overall Pounds Gained: 0      Do you smoke?  none    Alcohol intake:  Number of drinks at a time:  none  Number of times a week: none    Class Guidelines    Guidelines are reviewed with patient at the start of every class. 1. Patient understands that weight loss trial classes must be consecutive. Patient understands if they miss a class, it is their responsibility to contact me to reschedule class. I will reach out to patient after their first no show. 2.  Patient understands the expectations that weight maintenance/weight loss is expected during the classes. Failure to demonstrate changes may result in one extra month of weight loss trial, followed by going back to see the surgeon. 3. Patient is also instructed to be doing their labs, blood work, psych visit, support group and any other test that the surgeon has used while they are working on their weight loss trial.    Other Pertinent Information:     Changes Made Since Last Class: Continue meal prep and packing her lunch            Dietary Instruction    During today's class we continued to focus on the key diet principles. Patient was instructed to follow a low carbohydrate diet, focusing on meat and vegetables. Patient was instructed to stop liquid calories and aim for 64 ounces of water per day. In class, I also gave patient a power point on surviving the holidays.   Some of the tips included survival tips for parties, including bringing their own low carbohydrate dish to a potluck dinner and surveying the buffet line before they start filling up their plate. Patient was given cooking alternatives, including using Splenda for sugar, substituting applesauce for oil in recipes, and using low fat plain yogurt instead of sour cream in dips. Patient was also encouraged to be mindful of calories in alcohol. Patient's diet habits include: 3 meals a day. States she is focusing more on protein than she previously had been. She is snacking on string cheese, protein bar, yogurt, and sugar free jello. She is drinking 32-64 ounces of water per day. She states she no longer eats out and packs her lunch. Physical Activity/Exercise    Patient is currently doing walking for 15-30 minutes, 3 x a week for activity. Today's power point on surviving the holidays also included tips on exercising. This included being creative during the holiday, walking stairs, mall walking, getting resistance bands. Patient was encouraged not to be afraid to excuse themselves from the table to go for a walk after they eat. Comments:     Behavior Modification    Reinforced behavior changes to make. Patient was encouraged to keep their emotions in check. Try to HALT and focus on whether they are eating out of hunger or if they are eating out of emotions. Other eating behaviors included surveying the buffet line before starting to fill up their plate.    Patient was given a check off list and encouraged to monitor some of their eating behaviors, such as eating slowly, chewing their food thoroughly, and taking 20-30 minutes to eat a meal.    Goals that patient set for next month include:  Smaller portions   Less cheese      Cynthia Bob, MS RD  11/9/2017

## 2017-12-04 ENCOUNTER — DOCUMENTATION ONLY (OUTPATIENT)
Dept: MEDSURG UNIT | Age: 37
End: 2017-12-04

## 2017-12-04 NOTE — PROGRESS NOTES
Patient attended pre op class with Jacqueline and [place]. Patient was educated on all pre op instructions below. A copy was provided. All questions were answered  7 day Pre-Op LIQUID Diet    Benefits:  o Reducing intake before surgery will shrink your liver by  depleting glycogen. (a form of stored energy)  o Reduced liver size gives better access to stomach during  surgery; which translates to a safer surgery. o Prevents the \"last supper\" syndrome  o Experiencing weight loss before the procedure encourages  post-operative compliance and \"jump-starts\" weight loss    Specifics:  o Start 7 days before surgery:  ____________  o NO SOLID FOODS!!  o Your surgery will be CANCELED if this diet is not followed!!!  o Minimum of 64oz. of fluid daily, including protein drinks.  o No added sugar or carbonated beverages  o Continue to take all your prescribed medication and your vitamin supplements during this preoperative diet phase. CLEAR LIQUIDS:   Water   Sugar free, non-carbonated beverages (crystal light, propel)   Sugar free popsicles   Sugar free Jell-O   Fat free, reduced sodium broth    Decaffeinated coffee or decaffeinated tea with artificial sweeteners. PROTEIN:   60 grams of protein daily (in liquid supplements)   Pre-made protein drinks   Protein powder added to water    3 gram rule: limit sugar and fat to less than 3 grams per 8oz.  4-6 oz. low fat/low sugar yogurt OR cottage cheese 3-4 times during the week      Following Gastric Bypass surgery you will no longer be able to take NSAIDs (Non-Steriodal Anti-Inflammatory Drugs) EVER again. NSAIDs are the leading cause of stomach ulcers following Gastric Bypass surgery. (Patients having the Gastric Sleeve will not be able to take NSAIDs for 6 weeks following surgery.)      MOST COMMONLY TAKEN    NSAIDs    to be AVOIDED!! 1. Ibuprofen  2. Advil  3. Motrin  4. Excedrin  5. Aspirin  6. Celebrex  7. Naproxen  8. Aleve  9. Voltaren  10. Mobic    Attached is an extensive list of additional NSAIDs that cannot be taken following surgery. Please be sure to review this list when you are being prescribed new medications. This list covers the majority of NSAIDs on the market. Be aware that additional NSAIDs do exist and new medications are being introduced regularly. You must be your own advocate!! Non-Steriodal Anti-Inflammatory Drugs (NSAIDs)  ? The following is a list of NSAIDS that are NEVER to be taken again after Gastric Bypass surgery    Ibuprofen which is also known as : Advil, Advil Childrens, Advil Jose Strength, Advil Liquigel, Advil Migraine, Advil Pediatric, Childrens Ibuprofen Berry, Genpril, IBU, Midol IB, Midol Maximum Strength Cramp Formula, Dolgesic, Motrin Childrens, Motrin IB, Motrin Infant Drops, Motrin Jose Strength, Motrin Migraine Pain, Nuprin, Migraine Liqui-gels, Ibu-Tab 200, Cap-Profen, Tab-Profen, Profen, Ibuprohm, Childrens Elixsure, IB Pro, Vicoprofen, Combunox, A-G Profen, Actiprofen, Addaprin, Advil Infants Concentrated Drops, Caldolor, [place], Q-Profen, Ibifon 600, Ibren, [place], Midol Cramps & Bodyaches, [place], Nidia, [place], [place], Uni-Pro, Wal-Profen    Aspirin which has the brand name: Arthritis Pain, Aspergum, Aspir-Low, Aspirin Lite Coat, Lindsey Aspirin, Bufferin, Easprin, Ecotrin, Empirin, Fasprin, [place], Halfprin, [place] Aspirin, [place] Aspirin, Excedrin    Ketoprofen which is known as: Orudis KT, Oruvail, Actron. Sulindac which is sold as: Clinoril.     Naproxen is one of the most common NSAIDs, and is sold as: Aleve, Naprosyn, EC-Naprosyn, Naprelan, Anaprox, All Day Pain Relief, Aflaxen, All Day Relief, Anaprox-DS, Comfort Pac  With Naproxen,  Aleve Caplet, Aleve Easy Open Arthritis, Aleve Gelcap,  Anaprox-DS, EC-Naprosyn, Leader Naproxen Sodium, Midol Extended Relief, Naprelan 375?, Naprelan 500?, Naprelan 750?, Prevacid NapraPac 375,  Prevacid NapraPac, Naprapac, Vimovo. Etodolac is sold under the brand name: Lodine XL, Lodine. Fenoprofen can be found on the market as: Nalfon, Nalfon 200. Diclofenac sold as: Arthrotec, Cataflam, Voltaren, Cambia, Voltaren-XR, Zipsor. Flurbiprofen sold as: Asaid. Ketorolac is sold under the brand name: Sprix, Toradol, Toradol IM, Toradol IV/IM. Piroxicam is found on the market as: Feldene. Indomethacin known as: Indocin SR, Indocin, Indo-Shayy, Indomethegan, Indocin IV. Mefenamic Acid sold as: Ponstel. Meloxicam is sold under the brand name: Mobic    Nabumetone which is sold as: Relafen. Oxaprozin which has the brand name: Daypro    Ketoprofen which has the brand name: Actron, Orudis KT, Orudis, Oruvail    Famotidine and Ibuprofen can be found as: Duexis    Meclofenamate sold as: Meclomen    Tolmetin which can be found on the marked as: Tolectin, Tolectin 600, Tolectin DS    Salsalate which is sold as: Disalcid. Celecoxib which is sold as: Celebrex      Patients name: ________________________________  Date of surgery: ____________    Pre-op instructions:  1. Shower with the antibacterial soap  the 3 days prior to surgery. 2. Pre-admission testing will contact you 1 week before surgery  3. [facility] will contact you the day before surgery to confirm your surgery time.  Email or call your surgery scheduler if you have not heard from them by 3pm  4. Nothing to eat or drink (NPO) after midnight the night before surgery.  Take any evening medications by 11pm  5. Wear comfortable clothing. 6. Do not bring any valuables. 7. Bring insurance card, prescription card, photo ID and credit card to the hospital.  **Discuss all home medications with your surgeon at you pre-op appointment. Your surgeon will inform you of what medications to stop before surgery and what medications to take the day of surgery.     **STOP all NSAIDS (Ibuprofen, Aleve, Advil, Naprosyn etc.) and Aspirin 7 days before your surgery      Important Information:  1. No lifting over 15 lbs. for 6 weeks post-op  2. No driving for 1-14 days after surgery - must be off pain medication. 3. No smoking EVER again! 4. You will NOT be able to take any Non-Steroidal Anti-inflammatories (NSAIDS), Aspirin or Steroids  a. Bypass/revision patients - EVER again. (Tylenol only)  b. Sleeve patients - 6 weeks after surgery  5. Pulse/temperature- twice daily for 2 weeks post-op   6. Avoid pregnancy for 18 months  7. Key Diet principles:  a. Vitamin/mineral supplements-Stone Complete, Calcium citrate, Vitamin D3, Vitamin B12  b.  Protein supplements - 60-70 grams/day  c. Minimum 64 oz. fluid per day      What to Expect in the Hospital:  Pre-op area:  o Emergency door take elevator 2nd floor  o Arrive 1.5 hours before surgery  o Lovenox (blood thinner)  o Incentive Spirometer  o SCDs  o Sign consent  o Anesthesia  Start IV    Operating Room:    o Gastric bypass: 2- 2 ½ hours  o Sleeve gastrectomy: 1-1 ½ hours  o Revision: 2-3 hours    PACU(Post Anesthesia Care Unit):  o Nasal cannula  o EKG monitor  o Blood pressure cuff  o Pulse Ox  o Dietrich Catheter  o SCDs  o No family allowed  o Minimum one hour      2 Central (Day of Surgery):  o Private room  o 1-2 oz. ice chips per hour   o Pain Management ( Three Tier)  Tylenol given first- 650 mg PO  Oxycodone 5 mg PO   Dilaudid IV  Remember other alternatives to pain medication   Get patient out of bed and walking this helps tremendously  Position change or get patient up to the chair  Ice [place]kin to Abdomen  Guided Imagery  o Discontinue dietrich catheter  o Walk within 4 hours  o One family member can spend the night (must 18 years or older)  o Cell phone/computers - OK    2 Central (Post-op Day 1/Post-op Day 2):  o 7am - Gastrograffin swallow study (X-ray) for:  o All sleeve gastrectomy patients  o All revision patients   o Discontinue -nasal cannula and heart rate monitor  o Start bariatric clear liquid diet - water, crystal light, broth, Jell-O and protein supplement  o Slowly increase to 3 oz. clear liquids and 1 oz. protein supplement per hour  o Oral pain medication as needed for pain - communicate with your nurse  o Goal:  48 to 64 ounces, clear liquid per day preferable water  o Discharge instructions/Lovenox self-injection instructions   o WALK & WATER    Post-op Goals:  1. Void within 8 hours of your catheter being removed  2. Pain is well controlled with oral pain medication  3. Nausea is under control/No vomiting  4. Tolerating 3 oz. of clear liquids and 1 oz. protein supplement per hour for 3 consecutive hours. Post-op Follow-up Labs will need to be completed 1-2 weeks BEFORE your 3 month, 6 month and yearly visits. These labs are required and your appointment will be rescheduled if they are not completed. My surgical procedure and post-operative hospital stay has been discussed with me and I have been given the opportunity to ask any questions I may have. Patient Signature: ____________________________  Date: _____________________    THINGS I NEED TO KNOW BEFORE SURGERY  1. How many ounces of fluid will you need to drink every day after surgery? _______________    2. List 3 examples of bariatric clear liquids. ________________________  ________________________  ________________________  3. What is the 3 gram rule?   ______________________________________________________________      4. What is the 30 minute rule?  ______________________________________________________________      5. What are examples of food you will be able to eat on the bariatric soft and moist diet?  _________________________  _________________________  _________________________  6. After surgery I will be able to use a straw. TRUE    FALSE    7. After surgery I will NOT be able to drink carbonated beverages. TRUE    FALSE  8. After surgery I will be able to drink caffeine. TRUE   FALSE    9.  What 4 vitamins will you take after surgery?  ______________________           _________________________  _____________________           _________________________  10. How much exercise should you get daily? _________________    11. How long should it take you to eat a meal? ________________    12. The Calcium I have purchased is: ______________________. This has ________ mg per serving. I will need to take this ________ times a day. 13. The protein drink I have decided on is: ______________. This has _________ grams of protein. I will need to drink ______ of my protein drinks during the full liquid phase and _____ during the soft protein phase. My protein drinks will give me ______ ounces of fluid. 14. After having a sleeve gastrectomy I will not be able to take NSAIDs (Non-Steroidal Anti-inflammatory Drugs) for ______ weeks. 15. After having a gastric bypass I will not be able to take NSAIDs (Non-Steroidal Anti-Inflammatory Drugs) for _____________. PRE-OP CHECKLIST    THINGS TO DO AT MY PRE-OP APPOINTMENT WITH MY SURGEON:  ? Discuss ALL my current medications with my surgeon. Know what medications needs to be stopped before surgery AND what medications need to be taken on the morning of surgery. ? blood pressure/diuretic medications. ? Coumadin, Plavix or other blood thinners    ? Stop the following diabetic medications (if applicable): ____________________________________________________on: ______________  ? Use Regular Insulin (Novolog Pen) according to the following sliding scale: Insulin Sliding Scale  Blood sugar:  Amount of insulin:  Under 150  no insulin  150-200  2 units  201-250  4 units  251-300  6 units  301-350  8 units  351-400  10 units  400 or greater 12 units and call physician [phone]    THINGS TO DO FOLLOWING the PRE-OP CLASS:  ? Read through all information given to me by:  ? My dietitian Lg Wilson or Luís)  ? Nava/Jacqueline at the Pre-op class    ?  Contact my insurance company to find out the out of pocket cost of Lovenox (enoxaparin). $____________      THINGS TO DO BEFORE SURGERY:    ? Start the pre-op liquid diet on: ___________    ? Stop all NSAIDS (see attached sheet with list of NSAIDs) and Aspirin 7 days before my surgery: ___________  ? Contact my doctor(PCP or surgeon) regarding stopping Coumadin, Plavix or other blood thinners    ? Purchase bariatric clear liquids (Crystal Lite, sugar free Jell-O, broth, sugar free popsicles, protein supplement) and bariatric soft and moist foods (low fat yogurt, cottage cheese, eggs, tuna, fish, chicken) so that Im prepared when I get home from the hospital.     ? Purchase all vitamins that will be required following surgery. 1. Chewable multivitamin - 2 per day(ex: Flintstones)  2. Calcium Citrate - 1500mg (500mg 3 times a day)  3. Vitamin B12 - 1000mcg daily  4. Vitamin D3 - 5000IU daily    ? Create a system to keep track of how many ounces of fluid I am drinking daily. ? Post-op GOAL: 64oz per day    ? Purchase a protein supplement that I like:  ? Brand: ______________    ? Ounces: __________   ?  Grams of protein per serving: _________

## 2017-12-08 ENCOUNTER — ANESTHESIA EVENT (OUTPATIENT)
Dept: SURGERY | Age: 37
DRG: 621 | End: 2017-12-08
Payer: COMMERCIAL

## 2017-12-08 RX ORDER — FAMOTIDINE 20 MG/1
20 TABLET, FILM COATED ORAL ONCE
Status: CANCELLED | OUTPATIENT
Start: 2017-12-08 | End: 2017-12-08

## 2017-12-11 ENCOUNTER — HOSPITAL ENCOUNTER (INPATIENT)
Age: 37
LOS: 2 days | Discharge: HOME | DRG: 621 | End: 2017-12-13
Attending: SURGERY | Admitting: SURGERY
Payer: COMMERCIAL

## 2017-12-11 ENCOUNTER — ANESTHESIA (OUTPATIENT)
Dept: SURGERY | Age: 37
DRG: 621 | End: 2017-12-11
Payer: COMMERCIAL

## 2017-12-11 PROBLEM — E66.01 MORBID OBESITY DUE TO EXCESS CALORIES (HCC): Status: ACTIVE | Noted: 2017-12-11

## 2017-12-11 LAB — HCG UR QL: NEGATIVE

## 2017-12-11 PROCEDURE — 77030035045 HC TRCR ENDOSC VRSPRT BLDLSS COVD -B: Performed by: SURGERY

## 2017-12-11 PROCEDURE — 0D164ZA BYPASS STOMACH TO JEJUNUM, PERCUTANEOUS ENDOSCOPIC APPROACH: ICD-10-PCS | Performed by: SURGERY

## 2017-12-11 PROCEDURE — 77030008477 HC STYL SATN SLP COVD -A: Performed by: ANESTHESIOLOGY

## 2017-12-11 PROCEDURE — 74011250636 HC RX REV CODE- 250/636: Performed by: SURGERY

## 2017-12-11 PROCEDURE — 77030036598 HC CARTDRG STPL RELD ECHELON FLX J&J -D: Performed by: SURGERY

## 2017-12-11 PROCEDURE — 77030005515 HC CATH URETH FOL14 BARD -B: Performed by: SURGERY

## 2017-12-11 PROCEDURE — 81025 URINE PREGNANCY TEST: CPT

## 2017-12-11 PROCEDURE — 76210000016 HC OR PH I REC 1 TO 1.5 HR: Performed by: SURGERY

## 2017-12-11 PROCEDURE — 77030011640 HC PAD GRND REM COVD -A: Performed by: SURGERY

## 2017-12-11 PROCEDURE — 74011250636 HC RX REV CODE- 250/636

## 2017-12-11 PROCEDURE — 77030035051: Performed by: SURGERY

## 2017-12-11 PROCEDURE — 77030032490 HC SLV COMPR SCD KNE COVD -B: Performed by: SURGERY

## 2017-12-11 PROCEDURE — 77030027138 HC INCENT SPIROMETER -A

## 2017-12-11 PROCEDURE — 77030031139 HC SUT VCRL2 J&J -A: Performed by: SURGERY

## 2017-12-11 PROCEDURE — 77030027491 HC SHR ENDOSC COVD -B: Performed by: SURGERY

## 2017-12-11 PROCEDURE — 77030035050: Performed by: SURGERY

## 2017-12-11 PROCEDURE — 77030008683 HC TU ET CUF COVD -A: Performed by: ANESTHESIOLOGY

## 2017-12-11 PROCEDURE — 76060000036 HC ANESTHESIA 2.5 TO 3 HR: Performed by: SURGERY

## 2017-12-11 PROCEDURE — 74011000250 HC RX REV CODE- 250: Performed by: SURGERY

## 2017-12-11 PROCEDURE — 77030027876 HC STPLR ENDOSC FLX PWR J&J -G1: Performed by: SURGERY

## 2017-12-11 PROCEDURE — 65270000029 HC RM PRIVATE

## 2017-12-11 PROCEDURE — 77030008438 HC STPL REINF SEMGD WLGO -D: Performed by: SURGERY

## 2017-12-11 PROCEDURE — 77030036732 HC RELD STPLR VASC J&J -F: Performed by: SURGERY

## 2017-12-11 PROCEDURE — 77030020254 HC SOL INJ D5LR LACTATED RINGER

## 2017-12-11 PROCEDURE — 77030035029 HC NDL INSUF VERES DISP COVD -B: Performed by: SURGERY

## 2017-12-11 PROCEDURE — 74011000250 HC RX REV CODE- 250: Performed by: NURSE ANESTHETIST, CERTIFIED REGISTERED

## 2017-12-11 PROCEDURE — 76010000172 HC OR TIME 2.5 TO 3 HR INTENSV-TIER 1: Performed by: SURGERY

## 2017-12-11 PROCEDURE — 77030035048 HC TRCR ENDOSC OPTCL COVD -B: Performed by: SURGERY

## 2017-12-11 PROCEDURE — 77030013079 HC BLNKT BAIR HGGR 3M -A: Performed by: ANESTHESIOLOGY

## 2017-12-11 PROCEDURE — 77030018846 HC SOL IRR STRL H20 ICUM -A: Performed by: SURGERY

## 2017-12-11 PROCEDURE — 77030002933 HC SUT MCRYL J&J -A: Performed by: SURGERY

## 2017-12-11 PROCEDURE — 74011258636 HC RX REV CODE- 258/636: Performed by: SURGERY

## 2017-12-11 PROCEDURE — 74011250637 HC RX REV CODE- 250/637: Performed by: SURGERY

## 2017-12-11 PROCEDURE — 74011250636 HC RX REV CODE- 250/636: Performed by: NURSE ANESTHETIST, CERTIFIED REGISTERED

## 2017-12-11 PROCEDURE — 77030002996 HC SUT SLK J&J -A: Performed by: SURGERY

## 2017-12-11 PROCEDURE — 74011000250 HC RX REV CODE- 250

## 2017-12-11 PROCEDURE — 77030016151 HC PROTCTR LNS DFOG COVD -B: Performed by: SURGERY

## 2017-12-11 RX ORDER — FAMOTIDINE 20 MG/1
TABLET, FILM COATED ORAL
Status: DISPENSED
Start: 2017-12-11 | End: 2017-12-11

## 2017-12-11 RX ORDER — ONDANSETRON 2 MG/ML
4 INJECTION INTRAMUSCULAR; INTRAVENOUS EVERY 6 HOURS
Status: DISCONTINUED | OUTPATIENT
Start: 2017-12-11 | End: 2017-12-13 | Stop reason: HOSPADM

## 2017-12-11 RX ORDER — OXYCODONE HYDROCHLORIDE 5 MG/1
5 TABLET ORAL
Status: DISCONTINUED | OUTPATIENT
Start: 2017-12-11 | End: 2017-12-13 | Stop reason: HOSPADM

## 2017-12-11 RX ORDER — MIDAZOLAM HYDROCHLORIDE 1 MG/ML
INJECTION, SOLUTION INTRAMUSCULAR; INTRAVENOUS AS NEEDED
Status: DISCONTINUED | OUTPATIENT
Start: 2017-12-11 | End: 2017-12-11 | Stop reason: HOSPADM

## 2017-12-11 RX ORDER — FAMOTIDINE 10 MG/ML
INJECTION INTRAVENOUS
Status: DISPENSED
Start: 2017-12-11 | End: 2017-12-11

## 2017-12-11 RX ORDER — NEOSTIGMINE METHYLSULFATE 5 MG/5 ML
SYRINGE (ML) INTRAVENOUS AS NEEDED
Status: DISCONTINUED | OUTPATIENT
Start: 2017-12-11 | End: 2017-12-11 | Stop reason: HOSPADM

## 2017-12-11 RX ORDER — HYDROMORPHONE HCL IN 0.9% NACL 50 MG/50ML
1 PLASTIC BAG, INJECTION (ML) INJECTION
Status: DISCONTINUED | OUTPATIENT
Start: 2017-12-11 | End: 2017-12-13 | Stop reason: HOSPADM

## 2017-12-11 RX ORDER — ONDANSETRON 2 MG/ML
4 INJECTION INTRAMUSCULAR; INTRAVENOUS
Status: COMPLETED | OUTPATIENT
Start: 2017-12-11 | End: 2017-12-11

## 2017-12-11 RX ORDER — GLYCOPYRROLATE 0.2 MG/ML
INJECTION INTRAMUSCULAR; INTRAVENOUS AS NEEDED
Status: DISCONTINUED | OUTPATIENT
Start: 2017-12-11 | End: 2017-12-11 | Stop reason: HOSPADM

## 2017-12-11 RX ORDER — SODIUM CHLORIDE, SODIUM LACTATE, POTASSIUM CHLORIDE, CALCIUM CHLORIDE 600; 310; 30; 20 MG/100ML; MG/100ML; MG/100ML; MG/100ML
75 INJECTION, SOLUTION INTRAVENOUS CONTINUOUS
Status: DISCONTINUED | OUTPATIENT
Start: 2017-12-12 | End: 2017-12-11 | Stop reason: HOSPADM

## 2017-12-11 RX ORDER — ENOXAPARIN SODIUM 100 MG/ML
40 INJECTION SUBCUTANEOUS DAILY
Status: DISCONTINUED | OUTPATIENT
Start: 2017-12-12 | End: 2017-12-13 | Stop reason: HOSPADM

## 2017-12-11 RX ORDER — DIPHENHYDRAMINE HYDROCHLORIDE 50 MG/ML
25 INJECTION, SOLUTION INTRAMUSCULAR; INTRAVENOUS
Status: DISCONTINUED | OUTPATIENT
Start: 2017-12-11 | End: 2017-12-13 | Stop reason: HOSPADM

## 2017-12-11 RX ORDER — VECURONIUM BROMIDE FOR INJECTION 1 MG/ML
INJECTION, POWDER, LYOPHILIZED, FOR SOLUTION INTRAVENOUS AS NEEDED
Status: DISCONTINUED | OUTPATIENT
Start: 2017-12-11 | End: 2017-12-11 | Stop reason: HOSPADM

## 2017-12-11 RX ORDER — DEXTROSE, SODIUM CHLORIDE, SODIUM LACTATE, POTASSIUM CHLORIDE, AND CALCIUM CHLORIDE 5; .6; .31; .03; .02 G/100ML; G/100ML; G/100ML; G/100ML; G/100ML
150 INJECTION, SOLUTION INTRAVENOUS CONTINUOUS
Status: DISCONTINUED | OUTPATIENT
Start: 2017-12-11 | End: 2017-12-13 | Stop reason: HOSPADM

## 2017-12-11 RX ORDER — HYDROMORPHONE HYDROCHLORIDE 1 MG/ML
INJECTION, SOLUTION INTRAMUSCULAR; INTRAVENOUS; SUBCUTANEOUS AS NEEDED
Status: DISCONTINUED | OUTPATIENT
Start: 2017-12-11 | End: 2017-12-11 | Stop reason: HOSPADM

## 2017-12-11 RX ORDER — FENTANYL CITRATE 50 UG/ML
25 INJECTION, SOLUTION INTRAMUSCULAR; INTRAVENOUS AS NEEDED
Status: DISCONTINUED | OUTPATIENT
Start: 2017-12-11 | End: 2017-12-11

## 2017-12-11 RX ORDER — ONDANSETRON 2 MG/ML
INJECTION INTRAMUSCULAR; INTRAVENOUS AS NEEDED
Status: DISCONTINUED | OUTPATIENT
Start: 2017-12-11 | End: 2017-12-11 | Stop reason: HOSPADM

## 2017-12-11 RX ORDER — HYOSCYAMINE SULFATE 0.12 MG/1
0.12 TABLET SUBLINGUAL
Status: DISCONTINUED | OUTPATIENT
Start: 2017-12-11 | End: 2017-12-13 | Stop reason: HOSPADM

## 2017-12-11 RX ORDER — FENTANYL CITRATE 50 UG/ML
INJECTION, SOLUTION INTRAMUSCULAR; INTRAVENOUS AS NEEDED
Status: DISCONTINUED | OUTPATIENT
Start: 2017-12-11 | End: 2017-12-11 | Stop reason: HOSPADM

## 2017-12-11 RX ORDER — ENOXAPARIN SODIUM 100 MG/ML
40 INJECTION SUBCUTANEOUS ONCE
Status: COMPLETED | OUTPATIENT
Start: 2017-12-11 | End: 2017-12-11

## 2017-12-11 RX ORDER — LIDOCAINE HYDROCHLORIDE 20 MG/ML
INJECTION, SOLUTION EPIDURAL; INFILTRATION; INTRACAUDAL; PERINEURAL AS NEEDED
Status: DISCONTINUED | OUTPATIENT
Start: 2017-12-11 | End: 2017-12-11 | Stop reason: HOSPADM

## 2017-12-11 RX ORDER — ACETAMINOPHEN 325 MG/1
TABLET ORAL
COMMUNITY

## 2017-12-11 RX ORDER — PROPOFOL 10 MG/ML
INJECTION, EMULSION INTRAVENOUS AS NEEDED
Status: DISCONTINUED | OUTPATIENT
Start: 2017-12-11 | End: 2017-12-11 | Stop reason: HOSPADM

## 2017-12-11 RX ORDER — DEXAMETHASONE SODIUM PHOSPHATE 4 MG/ML
INJECTION, SOLUTION INTRA-ARTICULAR; INTRALESIONAL; INTRAMUSCULAR; INTRAVENOUS; SOFT TISSUE AS NEEDED
Status: DISCONTINUED | OUTPATIENT
Start: 2017-12-11 | End: 2017-12-11 | Stop reason: HOSPADM

## 2017-12-11 RX ORDER — ACETAMINOPHEN 325 MG/1
650 TABLET ORAL
Status: DISCONTINUED | OUTPATIENT
Start: 2017-12-11 | End: 2017-12-13 | Stop reason: HOSPADM

## 2017-12-11 RX ORDER — SODIUM CHLORIDE, SODIUM LACTATE, POTASSIUM CHLORIDE, CALCIUM CHLORIDE 600; 310; 30; 20 MG/100ML; MG/100ML; MG/100ML; MG/100ML
75 INJECTION, SOLUTION INTRAVENOUS CONTINUOUS
Status: DISCONTINUED | OUTPATIENT
Start: 2017-12-11 | End: 2017-12-11 | Stop reason: HOSPADM

## 2017-12-11 RX ORDER — SODIUM CHLORIDE, SODIUM LACTATE, POTASSIUM CHLORIDE, CALCIUM CHLORIDE 600; 310; 30; 20 MG/100ML; MG/100ML; MG/100ML; MG/100ML
50 INJECTION, SOLUTION INTRAVENOUS CONTINUOUS
Status: DISCONTINUED | OUTPATIENT
Start: 2017-12-11 | End: 2017-12-11

## 2017-12-11 RX ORDER — HYDROMORPHONE HYDROCHLORIDE 2 MG/ML
0.5 INJECTION, SOLUTION INTRAMUSCULAR; INTRAVENOUS; SUBCUTANEOUS
Status: DISCONTINUED | OUTPATIENT
Start: 2017-12-11 | End: 2017-12-11

## 2017-12-11 RX ADMIN — SODIUM CHLORIDE, SODIUM LACTATE, POTASSIUM CHLORIDE, CALCIUM CHLORIDE AND DEXTROSE MONOHYDRATE 150 ML/HR: 5; 600; 310; 30; 20 INJECTION, SOLUTION INTRAVENOUS at 12:20

## 2017-12-11 RX ADMIN — SODIUM CHLORIDE, SODIUM LACTATE, POTASSIUM CHLORIDE, AND CALCIUM CHLORIDE 75 ML/HR: 600; 310; 30; 20 INJECTION, SOLUTION INTRAVENOUS at 06:49

## 2017-12-11 RX ADMIN — GLYCOPYRROLATE 0.4 MG: 0.2 INJECTION INTRAMUSCULAR; INTRAVENOUS at 09:53

## 2017-12-11 RX ADMIN — DEXAMETHASONE SODIUM PHOSPHATE 4 MG: 4 INJECTION, SOLUTION INTRA-ARTICULAR; INTRALESIONAL; INTRAMUSCULAR; INTRAVENOUS; SOFT TISSUE at 07:51

## 2017-12-11 RX ADMIN — Medication 4 MG: at 09:53

## 2017-12-11 RX ADMIN — FENTANYL CITRATE 50 MCG: 50 INJECTION, SOLUTION INTRAMUSCULAR; INTRAVENOUS at 09:41

## 2017-12-11 RX ADMIN — ACETAMINOPHEN 650 MG: 325 TABLET, FILM COATED ORAL at 13:06

## 2017-12-11 RX ADMIN — FENTANYL CITRATE 100 MCG: 50 INJECTION, SOLUTION INTRAMUSCULAR; INTRAVENOUS at 07:33

## 2017-12-11 RX ADMIN — OXYCODONE HYDROCHLORIDE 5 MG: 5 TABLET ORAL at 15:08

## 2017-12-11 RX ADMIN — VECURONIUM BROMIDE FOR INJECTION 2 MG: 1 INJECTION, POWDER, LYOPHILIZED, FOR SOLUTION INTRAVENOUS at 08:10

## 2017-12-11 RX ADMIN — FENTANYL CITRATE 50 MCG: 50 INJECTION, SOLUTION INTRAMUSCULAR; INTRAVENOUS at 09:26

## 2017-12-11 RX ADMIN — ONDANSETRON 4 MG: 2 INJECTION INTRAMUSCULAR; INTRAVENOUS at 09:44

## 2017-12-11 RX ADMIN — VECURONIUM BROMIDE FOR INJECTION 1 MG: 1 INJECTION, POWDER, LYOPHILIZED, FOR SOLUTION INTRAVENOUS at 08:40

## 2017-12-11 RX ADMIN — Medication 1 MG: at 18:19

## 2017-12-11 RX ADMIN — LIDOCAINE HYDROCHLORIDE 100 MG: 20 INJECTION, SOLUTION EPIDURAL; INFILTRATION; INTRACAUDAL; PERINEURAL at 07:40

## 2017-12-11 RX ADMIN — VECURONIUM BROMIDE FOR INJECTION 1 MG: 1 INJECTION, POWDER, LYOPHILIZED, FOR SOLUTION INTRAVENOUS at 09:18

## 2017-12-11 RX ADMIN — CEFAZOLIN 3 G: 1 INJECTION, POWDER, FOR SOLUTION INTRAMUSCULAR; INTRAVENOUS; PARENTERAL at 07:40

## 2017-12-11 RX ADMIN — ONDANSETRON 4 MG: 2 INJECTION INTRAMUSCULAR; INTRAVENOUS at 22:30

## 2017-12-11 RX ADMIN — FAMOTIDINE 20 MG: 10 INJECTION, SOLUTION INTRAVENOUS at 06:49

## 2017-12-11 RX ADMIN — MIDAZOLAM HYDROCHLORIDE 2 MG: 1 INJECTION, SOLUTION INTRAMUSCULAR; INTRAVENOUS at 07:29

## 2017-12-11 RX ADMIN — ONDANSETRON 4 MG: 2 INJECTION INTRAMUSCULAR; INTRAVENOUS at 16:30

## 2017-12-11 RX ADMIN — HYDROMORPHONE HYDROCHLORIDE 1 MG: 1 INJECTION, SOLUTION INTRAMUSCULAR; INTRAVENOUS; SUBCUTANEOUS at 08:26

## 2017-12-11 RX ADMIN — ACETAMINOPHEN 650 MG: 325 TABLET, FILM COATED ORAL at 22:33

## 2017-12-11 RX ADMIN — ONDANSETRON 4 MG: 2 INJECTION INTRAMUSCULAR; INTRAVENOUS at 10:23

## 2017-12-11 RX ADMIN — PROPOFOL 200 MG: 10 INJECTION, EMULSION INTRAVENOUS at 07:40

## 2017-12-11 RX ADMIN — ENOXAPARIN SODIUM 40 MG: 40 INJECTION SUBCUTANEOUS at 06:49

## 2017-12-11 RX ADMIN — VECURONIUM BROMIDE FOR INJECTION 5 MG: 1 INJECTION, POWDER, LYOPHILIZED, FOR SOLUTION INTRAVENOUS at 07:40

## 2017-12-11 NOTE — IP AVS SNAPSHOT
Tari Henriquez 
 
 
 [address] [facility] [address] Patient: Davida Mcnamara MRN: [id number] :1980 About your hospitalization You were admitted on:  2017 You last received care in the:  [address] [place] [address],[address] You were discharged on:  2017 Why you were hospitalized Your primary diagnosis was:  Not on File Your diagnoses also included: Morbid Obesity Due To Excess Calories (Hcc) Things You Need To Do (next 8 weeks) Follow up with Demetrio Soni MD in 2 week(s) Phone:  [phone] Where:  [address] [facility], [facility] [place], [address] [address] Follow up with None Where:  None ([phone]) Patient stated that they have no PCP Wednesday Dec 27, 2017 POST OP with Demetrio Soni MD at  2:15 PM  
Where: [address] ([facility])  BARIATRIC PRE-OP with [facility] BARIATRIC DIETITIAN at  8:00 AM  
Where:  [facility]RIC  ([facility]) Discharge Orders None A check narayan indicates which time of day the medication should be taken. My Medications STOP taking these medications DEPO-PROVERA IM ZyrTEC 10 mg Cap Generic drug:  Cetirizine TAKE these medications as instructed Instructions Each Dose to Equal  
 Morning Noon Evening Bedtime CALCIUM 600 + D 600-125 mg-unit Tab Generic drug:  calcium-cholecalciferol (d3) Your last dose was: Your next dose is: Take  by mouth.  
     
   
   
   
  
 enoxaparin 40 mg/0.4 mL Commonly known as:  LOVENOX Your last dose was: Your next dose is: 0.4 mL by SubCUTAneous route daily. 40 mg Iron 325 mg (65 mg iron) tablet Generic drug:  ferrous sulfate Your last dose was: Your next dose is: Take  by mouth Daily (before breakfast). multivitamin tablet Commonly known as:  ONE A DAY Your last dose was: Your next dose is: Take 1 Tab by mouth daily. 1 Tab  
    
   
   
   
  
 oxyCODONE IR 5 mg immediate release tablet Commonly known as:  Yash Barnhart Your last dose was: Your next dose is: Take 1 Tab by mouth every four (4) hours as needed. Max Daily Amount: 30 mg.  
 5 mg TYLENOL 325 mg tablet Generic drug:  acetaminophen Your last dose was: Your next dose is: Take  by mouth every four (4) hours as needed for Pain. VITAMIN D3 5,000 unit Tab tablet Generic drug:  cholecalciferol (VITAMIN D3) Your last dose was: Your next dose is: Take  by mouth daily. Where to Get Your Medications Information on where to get these meds will be given to you by the nurse or doctor. ! Ask your nurse or doctor about these medications  
  enoxaparin 40 mg/0.4 mL  
 oxyCODONE IR 5 mg immediate release tablet Discharge Instructions DISCHARGE SUMMARY from Nurse PATIENT INSTRUCTIONS: 
 
 
F-face looks uneven A-arms unable to move or move unevenly S-speech slurred or non-existent T-time-call 911 as soon as signs and symptoms begin-DO NOT go Back to bed or wait to see if you get better-TIME IS BRAIN. Warning Signs of HEART ATTACK Call 911 if you have these symptoms: 
? Chest discomfort.  Most heart attacks involve discomfort in the center of the chest that lasts more than a few minutes, or that goes away and comes back. It can feel like uncomfortable pressure, squeezing, fullness, or pain. ? Discomfort in other areas of the upper body. Symptoms can include pain or discomfort in one or both arms, the back, neck, jaw, or stomach. ? Shortness of breath with or without chest discomfort. ? Other signs may include breaking out in a cold sweat, nausea, or lightheadedness. Don't wait more than five minutes to call [address]! Fast action can save your life. Calling 911 is almost always the fastest way to get lifesaving treatment. Emergency Medical Services staff can begin treatment when they arrive  up to an hour sooner than if someone gets to the hospital by car. The discharge information has been reviewed with the patient. The patient verbalized understanding. Discharge medications reviewed with the patient and appropriate educational materials and side effects teaching were provided. ___________________________________________________________________________________________________________________________________ Discharge Diet: 
Clear Liquid Bariatric Diet for 7 days, then soft moist protein for 5 weeks Discharge Medications: 
 *All medications as per Medicatoin Reconciliation Form* Flintstones Complete Chewable vitamins, 2 orally daily for life Calcium Citrate 2000mg orally daily for life Vitamin B12 1000micrograms sublingual daily for life Oxycodone 5mg tab, 1-2 by mouth every 4-6 hours as needed for pain not controlled with Tylenol Enoxaparin (Lovenox) 40mg sub-Q daily for 7 days Local wound care with daily showers, keep wounds clean and dry Activity: as desired, no lifting greater than 15lbs or situps for 30 days Special Instructions: 
 No driving until activity is not influenced by incisional pain and off narcotics No bath or hot tub until wounds are healed Pulse and temperature twice daily for 10 days Notify [facility] for a Temp >100.5 or Pulse>115 Followup with surgeon in 2 weeks [employer] Announcement We are excited to announce that we are making your provider's discharge notes available to you in [employer]. You will see these notes when they are completed and signed by the physician that discharged you from your recent hospital stay. If you have any questions or concerns about any information you see in [employer], please call the Health Information Department where you were seen or reach out to your Primary Care Provider for more information about your plan of care. Introducing [facility]! [facility] introduces [employer] patient portal. Now you can access parts of your medical record, email your doctor's office, and request medication refills online. 1. In your internet browser, go to [email or web address]. [employer] 2. Click on the First Time User? Click Here link in the Sign In box. You will see the New Member Sign Up page. 3. Enter your [employer] Access Code exactly as it appears below. You will not need to use this code after youve completed the sign-up process. If you do not sign up before the expiration date, you must request a new code. · [employer] Access Code: [email or web address] Expires: 2/6/2018  1:22 PM 
 
4. Enter the last four digits of your Social Security Number (xxxx) and Date of Birth (mm/dd/yyyy) as indicated and click Submit. You will be taken to the next sign-up page. 5. Create a [employer] ID. This will be your [employer] login ID and cannot be changed, so think of one that is secure and easy to remember. 6. Create a [employer] password. You can change your password at any time. 7. Enter your Password Reset Question and Answer. This can be used at a later time if you forget your password. 8. Enter your e-mail address. You will receive e-mail notification when new information is available in [address] 9. Click Sign Up. You can now view and download portions of your medical record. 10. Click the Download Summary menu link to download a portable copy of your medical information. If you have questions, please visit the Frequently Asked Questions section of the [employer] website. Remember, [employer] is NOT to be used for urgent needs. For medical emergencies, dial 911. Now available from your iPhone and Android! Providers Seen During Your Hospitalization Provider Specialty Primary office phone Katelin Massey MD General Surgery [phone] Your Primary Care Physician (PCP) Primary Care Physician Office Phone Office Fax NONE ** None ** ** None ** You are allergic to the following Allergen Reactions Tetracycline Other (comments) Increases symptoms of pseudo tumor Recent Documentation Height Weight BMI OB Status Smoking Status 1.74 m (!) 172.4 kg 56.94 kg/m2 Unknown Former Smoker Emergency Contacts Name Discharge Info Relation Home Work Mobile MaryannFerdinand N/A  AT THIS TIME [6] Brother [24] [phone] [facility] DISCHARGE CAREGIVER [3] Mother [14] [phone] Patient Belongings The following personal items are in your possession at time of discharge: 
  Dental Appliances: None  Visual Aid: Glasses      Home Medications: None   Jewelry: None  Clothing: Undergarments, Sweater, Footwear, Socks, Shirt, Pants    Other Valuables: Cell Phone, [employer] Discharge Instructions Attachments/References GASTRIC BYPASS: LAPAROSCOPIC FELIPE-EN-Y: POST-OP (ENGLISH) ENOXAPARIN (LOVENOX) (ENGLISH) Patient Handouts Laparoscopic Felipe-en-Y Gastric Bypass: What to Expect at Home Your Recovery A Felipe-en-Y (say \"symone-en-why\") gastric bypass is surgery to make the stomach smaller and change the connection between the stomach and the intestines. It is done to help people lose weight.  The surgery limits the amount of food the stomach can hold. This helps you eat less and feel full sooner. The cuts (incisions) the doctor made in your belly will probably be sore for several days after the surgery. If you have stitches, the doctor will take these out at your follow-up visit. You probably will lose weight very quickly in the first few months after surgery. As time goes on, your weight loss will slow down. You can expect most of your weight loss to happen in the first 12 months after your surgery. You will have regular doctor's appointments during this time to check how you are doing. It is important to think of this surgery as a tool to help you lose weight. It is not an instant fix. You will still need to eat a healthy diet and get regular exercise. This will help you reach your weight goal and avoid regaining the weight you lose. It is common to have many different emotions after this surgery. You may feel happy or excited as you begin to lose weight. But you may also feel overwhelmed or frustrated by the changes that you have to make in your diet, activity, and lifestyle. Talk with your doctor if you have concerns or questions. This care sheet gives you a general idea about how long it will take for you to recover. But each person recovers at a different pace. Follow the steps below to get better as quickly as possible. How can you care for yourself at home? Activity ? · Rest when you feel tired. Getting enough sleep will help you recover. ? · Try to walk each day. Start by walking a little more than you did the day before. Bit by bit, increase the amount you walk. Walking boosts blood flow and helps prevent pneumonia and constipation. ? · Avoid strenuous activities, such as bicycle riding, jogging, weight lifting, or aerobic exercise, until your doctor says it is okay.   
? · Until your doctor says it is okay, avoid lifting anything that would make you strain. This may include a child, heavy grocery bags and milk containers, a heavy briefcase or backpack, cat litter or dog food bags, or a vacuum . ? · Hold a pillow over your incisions when you cough or take deep breaths. This will support your belly and decrease your pain. ? · Do breathing exercises at home as instructed by your doctor. This will help prevent pneumonia. ? · Ask your doctor when you can drive again. ? · You will probably need to take 2 to 4 weeks off from work. It depends on the type of work you do and how you feel. You will probably return to normal activities within 3 to 5 weeks. ? · You may shower, if your doctor okays it. Pat the incisions dry. Do not take a bath for the first 2 weeks, or until your doctor tells you it is okay. ? · Ask your doctor when it is okay for you to have sex. Diet ? · Your doctor will give you specific instructions about what to eat after the surgery. For the first 2 to 6 weeks, you will need to follow a liquid or soft diet. Bit by bit, you will be able to add solid foods back into your diet. ? · Your doctor may recommend that you work with a dietitian to plan healthy meals that give you enough protein, vitamins, and minerals while you are losing weight. Even with a healthy diet, you probably will need to take vitamin and mineral supplements for the rest of your life. ? · At first you may feel full after just a few sips of water or other liquid. It is important to try to sip water throughout the day to avoid becoming dehydrated. ·  
? · You may notice that your bowel movements are not regular right after your surgery. This is common. Try to avoid constipation and straining with bowel movements. ? · Sometimes the stomach empties food into the small intestine too quickly. This is called dumping syndrome. It can cause diarrhea and make you feel faint, shaky, and nauseated. It also can make it hard for your body to get enough nutrition. ¨ High-sugar foods-such as desserts, soda pop, and fruit juices-are most likely to cause dumping syndrome. Avoid high-sugar foods, or use products that have artificial sweeteners if sugar gives you a problem. ¨ Do not drink liquids within a half hour before eating and up to an hour after eating. Liquids move food even more quickly into the small intestine. Quick emptying of the stomach increases the chance of diarrhea. ¨ Eat slowly. Try to chew each bite about 20 times. Allow 20 to 30 minutes for each meal. 
¨ Eat 5 or 6 small meals or snacks a day. This may keep you from feeling too full after eating and may reduce problems with diarrhea and dumping syndrome. Medicines ? · Your doctor will tell you if and when you can restart your medicines. He or she will also give you instructions about taking any new medicines. ? · If you take blood thinners, such as warfarin (Coumadin), clopidogrel (Plavix), or aspirin, be sure to talk to your doctor. He or she will tell you if and when to start taking those medicines again. Make sure that you understand exactly what your doctor wants you to do. ? · Be safe with medicines. Take pain medicines exactly as directed. ¨ If the doctor gave you a prescription medicine for pain, take it as prescribed. ¨ If you are not taking a prescription pain medicine, ask your doctor if you can take an over-the-counter medicine. ? · If you think your pain medicine is making you sick to your stomach: 
¨ Take your medicine after meals (unless your doctor has told you not to). ¨ Ask your doctor for a different pain medicine. ? · If your doctor prescribed antibiotics, take them as directed. Do not stop taking them just because you feel better. You need to take the full course of antibiotics. Incision care ? · If you have strips of tape on the incisions, leave the tape on for a week or until it falls off.  
? slow healing.  You may cover the area with a gauze bandage if it weeps or rubs against clothing. Change the bandage every day. ? · Keep the area clean and dry. Follow-up care is a key part of your treatment and safety. Be sure to make and go to all appointments, and call your doctor if you are having problems. It's also a good idea to know your test results and keep a list of the medicines you take. When should you call for help? Call 911 anytime you think you may need emergency care. For example, call if: 
? · You passed out (lost consciousness). ? · You are short of breath. ?Call your doctor now or seek immediate medical care if: 
? · You have pain that does not get better after you take pain medicine. ? · You cannot pass stool or gas. ? · You are sick to your stomach and cannot drink fluids. ? · You have loose stitches, or your incision comes open. ? · You have signs of a blood clot, such as: 
¨ Pain in your calf, back of the knee, thigh, or groin. ¨ Redness and swelling in your leg or groin. ? · You have signs of infection, such as: 
¨ Increased pain, swelling, warmth, or redness. ¨ Red streaks leading from the incision. ¨ Pus draining from the incision. ¨ A fever. ? Watch closely for changes in your health, and be sure to contact your doctor if you have any problems. Where can you learn more? Go to http://jose martin-melvin.info/. Enter Y354 in the search box to learn more about \"Laparoscopic Maria Antonia-en-Y Gastric Bypass: What to Expect at Home. \" Current as of: October 13, 2016 Content Version: 11.4 © 1872-2666 [employer]. Care instructions adapted under license by [employer] (which disclaims liability or warranty for this information). If you have questions about a medical condition or this instruction, always ask your healthcare professional. [address] any warranty or liability for your use of this information. Enoxaparin (Lovenox): Care Instructions Your Care Instructions Enoxaparin (Lovenox) is an anticoagulant medicine. It is one of a class of anticoagulants called low molecular weight heparin. Many people call these medicines blood thinners. They don't actually thin the blood, but they increase the time it takes a blood clot to form. This reduces the chance of a blood clot in the leg veins (deep vein thrombosis) or in the lungs (pulmonary embolism). Enoxaparin is a shot (injection). You or someone caring for you will inject it once or twice a day. Most people need shots for 5 to 10 days, but in some cases it can be longer. Your doctor will tell you how long you need to have the shots. Enoxaparin is used to: · Treat deep vein thrombosis (DVT), which is a blood clot in the legs, pelvis, or arms. · Reduce the chance of getting blood clots after certain surgeries. For example, you may take enoxaparin after knee or hip replacement surgery. · Reduce the chance of getting blood clots in people who are likely to get them and who are not active for a long period of time. For example, you may need enoxaparin if you need to stay in bed for a long time because of a health problem. · Reduce the chance of blood clots when another blood thinner is stopped for a short time. For example, if you take warfarin and need surgery, your doctor may ask you to stop taking warfarin for a short time before the surgery. You will take enoxaparin to help prevent blood clots before the surgery. After the surgery, your doctor will tell you when it is safe to start taking warfarin again. This is called bridge therapy. Follow-up care is a key part of your treatment and safety. Be sure to make and go to all appointments, and call your doctor if you are having problems. It's also a good idea to know your test results and keep a list of the medicines you take. How can you care for yourself at home? How to inject enoxaparin You will get a prescription for prefilled syringes.  Inject the medicine at the same time every day unless your doctor gives you other instructions. 1. Wash and dry your hands. 2. Sit or lie in a position that lets you see your belly. 3. Clean the injection site with an alcohol pad or swab, and let it dry. Choose a site on the right or left side of your belly, at least 2 inches from your belly button. Change the site each time you inject the medicine. 4. Remove the needle cap by pulling it straight off. Don't twist it. 5. Hold the syringe like a pencil in one hand. With the other hand, pinch an area of the injection site skin. You should have a \"fold\" in the skin. 6. Insert the entire needle straight down into the fold of skin. Don't insert the needle at an angle. 7. Press the plunger with your thumb until the syringe is empty. 8. Pull the needle straight out and let go of the skin. 9. Point the needle away from you and press down on the plunger. The needle will be covered. Take precautions · Don't rub the injection site. This could cause bruising. · Don't push air bubbles out of the syringe unless your doctor tells you to. Each syringe comes with air bubbles. · Don't stop taking enoxaparin without talking to your doctor. · If you are taking a blood thinner, be sure you get instructions about how to take your medicine safely. Blood thinners can cause serious bleeding problems. · Talk to your doctor before you take any prescription medicines, over-the-counter medicines, antibiotics, vitamins, or herbal products. · Don't take the following medicines unless your doctor says it's okay: ¨ Aspirin, products like aspirin (salicylates), or products that contain aspirin ¨ Nonsteroidal anti-inflammatory drugs (NSAIDs), such as ibuprofen (Advil, Motrin) and naproxen (Aleve) · Store enoxaparin at room temperature. Don't put it in the refrigerator or freezer. When should you call for help? Call 911 anytime you think you may need emergency care. For example, call if: ? · You passed out (lost consciousness). ? · You have signs of severe bleeding, such as: ¨ A severe headache that is different from past headaches. ¨ Vomiting blood or what looks like coffee grounds. ¨ Passing maroon or very bloody stools. ?Call your doctor now or seek immediate medical care if: 
? · You have unexpected bleeding, including: ¨ Blood in stools or black stools that look like tar. ¨ Blood in your urine. ¨ Bruises or blood spots under the skin. ? · You feel dizzy or lightheaded. ? Watch closely for changes in your health, and be sure to contact your doctor if: 
? · You do not get better as expected. Where can you learn more? Go to http://jose martin-melvin.info/. Enter P266 in the search box to learn more about \"Enoxaparin (Lovenox): Care Instructions. \" Current as of: September 21, 2016 Content Version: 11.4 © 4738-8975 [employer]. Care instructions adapted under license by [employer] (which disclaims liability or warranty for this information). If you have questions about a medical condition or this instruction, always ask your healthcare professional. [address] any warranty or liability for your use of this information. Please provide this summary of care documentation to your next provider. Signatures-by signing, you are acknowledging that this After Visit Summary has been reviewed with you and you have received a copy. Patient Signature:  ____________________________________________________________ Date:  ____________________________________________________________  
  
Teofilo Sandoval Provider Signature:  ____________________________________________________________ Date:  ____________________________________________________________

## 2017-12-11 NOTE — PROGRESS NOTES
[place]   Discharge Planning/ Assessment    Reasons for Intervention: Interviewed patient, she agrees to share her discharge information with her brother, see below. She was independent prior to admission and sees doctors at [facility]  for her primary care needs. Demographic information verified. Her discharge plan is to return home.       High Risk Criteria  [x] Yes  []No   Physician Referral  [] Yes  [x]No        Date    Nursing Referral  [] Yes  [x]No        Date    Patient/Family Request  [] Yes  [x]No        Date       Resources:    Medicare  [] Yes  [x]No   Medicaid  [] Yes  [x]No   No Resources  [] Yes  [x]No   Private Insurance  [x] Yes  []No [facility]    Name/Phone Number    Other  [] Yes  [x]No        (i.e. Workman's Comp)         Prior Services:    Prior Services  [] Yes  [x]No   Home Health  [] Yes  [x]No   [address]  [] Yes  [x]No        Number of [address]  [] Yes  [x]No       Meals on Wheels  [] Yes  [x]No   Office on Aging  [] Yes  [x]No   Transportation Services  [] Yes  [x]No   Nursing Home  [] Yes  [x]No        Nursing Home Name    [address]  [] Yes  [x]No        [address] Name    Other       Information Source:      Information obtained from  [x] Patient  [] Parent   [] [address]  [] Child  [] Spouse   [] Significant Other/Partner   [] Friend      [] EMS    [] Nursing Home Chart          [x] Other:chart   Chart Review  [x] Yes  []No     Family/Support System:    Patient lives with  [x] Alone    [] Spouse   [] Significant Other  [] Children  [] Caretaker   [] Parent  [] Sibling     [] Other       Other Support System:    Is the patient responsible for care of others  [] Yes  [x]No   Information of person caring for patient on  discharge self   Managers financial affairs independently  [x] Yes  []No   If no, explain:      Status Prior to Admission:    Mental Status  [x] Awake  [x] Alert  [x] Oriented  [] Quiet/Calm [] Lethargic/Sedated   [] Disoriented  [] Restless/Anxious  [] Combative   Personal Care  [] Dependent  [x] Independent Personal Care  [] Requires Assistance   Meal Preparation Ability  [x] Independent   [] Standby Assistance   [] Minimal Assistance   [] Moderate Assistance  [] Maximum Assistance     [] Total Assistance   Chores  [x] Independent with Chores   [] N/A Nursing Home Resident   [] Requires Assistance   Bowel/Bladder  [x] Continent  [] Catheter  [] Incontinent  [] Ostomy Self-Care    [] Urine Diversion Self-Care  [] Maximum Assistance     [] Total Assistance   Number of Persons needed for assistance    DME at home  [] [facility]  [] Ho Can   [] Commode    [] Bathroom/Grab Bars  [] Hospital Bed  [] Nebulizer  [] Oxygen           [] Raised Toilet Seat  [] Shower Chair  [] Side Rails for Bed   [] Tub Transfer Bench   [] Driss Smith  [] Mary Billingsley, Standard      [] Other:   Vendor      Treatment Presently Receiving:    Current Treatments  [] Chemotherapy  [] Dialysis  [] Insulin  [] IVAB [x] IVF   [] O2  [] PCA   [] PT   [] RT   [] Tube Feedings   [] Wound Care     Psychosocial Evaluation:    Verbalized Knowledge of Disease Process  [x] Patient  [x]Family   Coping with Disease Process  [x] Patient  [x]Family   Requires Further Counseling Coping with Disease Process  [] Patient  []Family     Identified Projected Needs:    Home Health Aid  [] Yes  [x]No   Transportation  [] Yes  [x]No   Education  [] Yes  [x]No        Specific Education     Financial Counseling  [] Yes  [x]No   Inability to Care for Self/Will Require 24 hour care  [] Yes  [x]No   Pain Management  [] Yes  [x]No   Home Infusion Therapy  [] Yes  [x]No   Oxygen Therapy  [] Yes  [x]No   DME  [] Yes  [x]No   Long Term Care Placement  [] Yes  [x]No   Rehab  [] Yes  [x]No   Physical Therapy  [] Yes  [x]No   Needs Anticipated At This Time  [] Yes  [x]No     Intra-Hospital Referral:    [facility]  [] Yes  [x]No   [] Yes  [x]No   Patient Representative  [] Yes  [x]No   Staff for Teaching Needs  [] Yes  [x]No   Specialty Teaching Needs     Diabetic Educator  [] Yes  [x]No   Referral for Diabetic Educator Needed  [] Yes  [x]No  If Yes, place order for Nutritionist or Diabetic Consult     Tentative Discharge Plan:    Home with No Services  [x] Yes  []No   Home with [address]  [] Yes  [x]No        If Yes, specify type    [address]  [] Yes  [x]No        If Yes, specify type    Meals on Wheels  [] Yes  [x]No   Office of Aging  [] Yes  [x]No   NHP  [] Yes  [x]No   Return to the Nursing Home  [] Yes  [x]No   Rehab Therapy  [] Yes  [x]No   Acute Rehab  [] Yes  [x]No   Subacute Rehab  [] Yes  [x]No   Private Care  [] Yes  [x]No   Substance Abuse Referral  [] Yes  [x]No   Transportation  [] Yes  [x]No   Chore Service  [] Yes  [x]No   Inpatient Hospice  [] Yes  [x]No   OP RT  [] Yes  [x] No   OP Hemo  [] Yes  [x] No   OP PT  [] Yes  [x]No   Support Group  [] Yes  [x]No   Reach to Recovery  [] Yes  [x]No   OP Oncology Clinic  [] Yes  [x]No   Clinic Appointment  [] Yes  [x]No   DME  [] Yes  [x]No   Comments    Name of D/C Planner or  Given to Patient or Family Leonel Tello RN   Phone Number [phone]   Date Dec 11, 2017   Time 302 am   If you are discharged home, whom do you designate to participate in your discharge plan and receive any information needed?      Enter name of Rizwan gutierrez        Phone # of [employer]         Address of [place]         Updated Dec 11, 2017        Patient refused to designate any           individual

## 2017-12-11 NOTE — PERIOP NOTES
1013  Patient received in PACU and connected to monitors. Vital signs stable. RN at bedside. Will continue to monitor. [address] [facility] [facility] to pt's mother via phone in waiting area re update, plan of care, and room @2201.

## 2017-12-11 NOTE — PROGRESS NOTES
conducted a pre-surgery visit with aHrmeet Camejo, who is a 39 y.o.,female. The  provided the following Interventions:  Initiated a relationship of care and support. Offered prayer and assurance of continued prayers on patient's behalf. Plan:  Chaplains will continue to follow and will provide pastoral care on an as needed/requested basis.  recommends bedside caregivers page  on duty if patient shows signs of acute spiritual or emotional distress.     Albino Crenshaw   Spiritual Care   [phone]

## 2017-12-11 NOTE — PROGRESS NOTES
TRANSFER - IN REPORT:    Verbal report received from [facility] on [address]  being received from PACU for routine post - op      Report consisted of patients Situation, Background, Assessment and   Recommendations(SBAR). Information from the following report(s) SBAR, OR Summary and Intake/Output was reviewed with the receiving nurse. Opportunity for questions and clarification was provided. Assessment completed upon patients arrival to unit and care assumed. Received pt via bed awake and alert. Lap sites to belly d/i. Dutton draining clear yellow urine. Ice pack applied to abdomen and teaching on ice chips PO given. Oriented to call bell, phone and IS with pt giving return demonstration. Parents at [facility]. NS bolus still infusing.

## 2017-12-11 NOTE — OP NOTES
Operative Note      PREOPERATIVE DIAGNOSIS: Clinically severe obesity, body mass index of Body mass index is 56.94 kg/(m^2). ,   comorbidities of GERD . POSTOPERATIVE DIAGNOSIS:  Same    PROCEDURES: Laparoscopic Maria Antonia-en-Y gastric bypass with 042 cm retrocolic   retrogastric Maria Antonia limb, 40 cm biliopancreatic limb, 15 ml    tubularized gastric pouch applied to the lesser curvature of stomach     SURGEON: Dr. Kaela Live MD FACS     ASSISTANT: SA Day    ANESTHESIA: General endotracheal anesthesia. Local anesthetic mixture 1%   lidocaine, 0.5% Marcaine, with epinephrine injected locally utilizing 60mL. FINDINGS: as above    SPECIMEN: none    ESTIMATED BLOOD LOSS: 25mL    FLUIDS: 3800mL    URINE OUTPUT: 350mL. DRAIN: dietrich     COMPLICATIONS: none    OPERATIVE START TIME: 0800    OPERATIVE COMPLETION TIME: 1000    DESCRIPTION OF PROCEDURE: The patient was prepped and draped in standard   sterile fashion after being placed under general anesthetic, dietrich catheter placement and intragastric placement of a 34 Fr Wolfgang tube. LUQ pneumoperitoneum was established via Veress needle and the abdomen was insufflated to 15mmHg. A site was selected superior   to the umbilicus in the midline. This area was incised. An Ethicon 5   mm Optical trocar was placed over the laparoscope and directed into the abdominal   Cavity using Optiview technique. Abdomen was surveyed. There was no evidence of injury from the entry. Additional trocars were then placed. All were 12 mm trocars, except for a 5mm trocar placed in the anterior axillary line left upper quadrant approximately 3 fingerbreadths below the costal margin. Another was placed in the lateral portion of the left rectus sheath approximately 3 superior to the umbilical trocar. Another was placed approximately 4 fingerbreadths superior to the umbilical trocar in the  lateral portion of right rectus sheath.  All were placed after incising with scalpel, all were placed using blunt dissecting technique. There was no evidence of injury from the entries. Instrument were placed in the abdominal cavity. The omentum and transverse colon   were reflected superiorly. The ligament of Treitz was identified. We then marched out 40 cm distal to the ligament of Treitz, divided at this level with A 60 mm [place] stapler. One additional load was used to divide down to the base of the mesentery. Both limbs were noted to be well perfused with visible pulses. Then, from the distal staple line, the Maria Antonia limb was measured 150 cm distally and an antimesenteric enterotomy was made using the Harmonic Scalpel. A similar enterotomy was made on the antimesenteric surface of the  biliopancreatic limb. Through these enterotomies, a 60 mm [place]   stapler was placed into the bowel, clamped on the anti-mesenteric borders and   fired to form a stapled side-to-side anastomosis. Remaining enterotomy was   closed in a running inverting fashion with 3-0 Vicryl suture. The mesenteric   defect was then closed with running 2-0 silk suture, extending on the bowel   wall in a running Lembert fashion for second layer closure of the   enterotomy. At this point, attention was directed to the transverse mesocolon. A site was selected just anterior to the ligament of Treitz. This area was incised using the Harmonic scalpel, entering the lesser sac. The Maria Antonia limb was then passed through the fenestration of the transverse mesocolon and into the lesser sac taking care not to twist it on its mesentery. At this point, the omentum and transverse colon were retracted inferiorly. The greater curvature of the stomach was then grasped and the gastrocolic   ligament was dissected off of it directly on the wall of the stomach using   the Harmonic scalpel to widely open the lesser sac.  Adhesions between the   posterior wall of the stomach and the retroperitoneum were taken down under   direct vision to fully free the lesser sac. At this point, an incision was made near the xiphoid. Through this   incision, a Kathie retractor was placed through the abdominal   wall beneath the left lateral segment of the liver and connected to a bedside   retraction system allowing exposure of the esophageal hiatus. The angle of   His was then dissected anterior to posterior down the left donta of the   diaphragm using Harmonic scalpel. The lesser curvature of the stomach was then examined. A site was selected just   proximal to the crow's foot of the vagus nerve in this area. The gastrohepatic ligament was dissected away from the lesser curvature of the stomach directly on the wall of the stomach to enter the lesser sac. This fenestration was then widened using Bovie cautery over a 10 mm articulating esophageal retractor which had been placed through the lesser sac and brought through the fenestration. Then, a 60 mm Blue load of the [place] stapler was placed transversely across the stomach through this   fenestration, clamped. The Wolfgang tube was advanced into the pouch. It was withdrawn and advanced to ensure the stapler did not grab it. The stapler was then fired to begin the formation of the gastric pouch. Another stapler was placed near the crotch of the previous staple line and directed superiorly toward the angle of His, clamped and then fired. The Wolfgang tube was then advanced along the lesser curve to facilitate sizing of the pouch. Then, a 60 mm [place] stapler with Seam Guard reinforcement was placed in the crotch of the previous staple line and clamped. . The stapler was snugged against the Wolfgang tube and then fired. Then, 2 additional staple loads were placed, each from the crotch of previous   staple line directed superiorly toward the angle of His until the gastric   pouch was fully divided from the distal stomach remnant.      Once fully divided, the staple lines were examined, noted to be hemostatic and viable, intraabdominal fat was placed between the staple lines to prevent gastro-gastric fistulization. The tip of the gastric pouch placed in the lesser sac and the distal stomach remnant was   retracted anteriorly to allow exposure of the lesser sac. The Maria Antonia limb was examined. The proximal 4 cm were noted to be tethered by its mesentery. This proximal segment was then excised from the mesentery using a 60 mm [place] staple load and then one additional load was used to divide the   devascularized segment from the remainder of the Maria Antonia limb. This segment   was brought out of the abdominal cavity via one of the trocar sites, passed off the field and discarded. At this point, the gastrojejunostomy was begun by sewing the right   antimesenteric border of the Maria Antonia limb to the distal portion of the gastric   pouch using running suture of 3-0 Vicryl. An anterior gastrotomy and   antimesenteric enterotomy were made. From the left apex of these 2   enterotomies, a 3-0 Vicryl suture was placed and run on the posterior surface   in a running inverting fashion to the right apex, transitioned on the anterior surface and sewn approximately half way across the aperture in an inverting fashion. Then, a second 3-0 Vicryl suture was placed at the left apex and sewn to the previous suture in a running inverting fashion on the anterior surface. Prior to tying these sutures, the Wolfgang tube was brought across the anastomosis. Then the sutures were tied, completing the inner layer. Then, from the left apex another 3-0 Vicryl suture was placed and run to the right apex in a running Lembert fashion completing the anterior outer layer of the anastomosis, thereby completing the anastomosis. Once this was complete, the Wolfgang tube was removed from the patient. Then, working from within the lesser sac, the Maria Antonia limb was sewn to the anterior surface of the transverse mesocolic defect with 3 interrupted sutures of 2-0 silk. Then, the left side of the maria antonia limb mesentery was closed to the left side of the tranverse mesocolic defect with a running suture of 2-0 silk. The omentum and transverse colon were reflected superiorly. The base of   the left side of the transverse mesocolic defect was exposed and this was   sewn to the base of the left side of the Maria Antonia limb mesentery with a   pursestring suture of 2-0 silk. The Maria Antonia limb was then retracted to the   left. The right side of the transverse mesocolic defect was then closed to the right side of the Maria Antonia limb mesentery with another pursestring suture of 2-0 silk, then one additional interrupted suture was placed between the right lateral surface of the Maria Antonia limb and the right lateral surface of the transverse mesocolic defect, completing the closure of the   defect around the Maria Antonia limb. Once this was complete, both mesenteric defects were examined and noted to   be well closed. Both anastomoses were examined and noted to be hemostatic and viable without evidence of leak. The omentum and transverse colon were retracted   inferiorly back to normal position. The gastric remnant was placed over the   anastomosis, returning the anastomosis into the lesser sac. The Kathie   retractor was removed. The abdomen was desufflated via the remaining trocars. All trocars were then removed. The trocar sites were rendered hemostatic with Bovie cautery, anesthetized with the local anesthetic mixture and then closed with interrupted 4-0 Monocryl. Dermabond was applied. The patient was extubated and  transferred to the perioperative care area in stable condition. The patient tolerated the procedure well, there were no complications. Sponge instrument, and needle counts were reported as correct x2.

## 2017-12-11 NOTE — PROGRESS NOTES
1400  Came at 1330 from PACU, alert and oriented, instructed about triflo, ambulation and measurement of intake , verbalized understanding. 1500  Ambulated  In the hallway with primary nurse, pain under control first with Tylenol, tele called, HR at 44, asymptomatic, HR up to 59 after pt was taking deep breathe and triflo, tele called 2x ,with HR 45 while pt sleeping when turn to her sides tele called HR was 72. Instructed her to use triflo. Monitor. 1800  Encourage ambulation and triflo, no nausea, pain under control with Tylenol and Katy.

## 2017-12-11 NOTE — PROGRESS NOTES
1200 Received pt, resting on bed, pain at tolerable level. No nausea, no vomiting as per pt. Pt started taking ice chips, well tolerated. 1427 Telemetry called, HR went down to 44. Checked pt now, pt is asymptomatic, HR checked manually, was 48. Pt using the IS,  HR went up to 59 per tele. Then right before going out of the room, tele called again to inform that pt's HR again is 43, then immediately went up to 77 as soon as the pt turned to her side. Will continue to monitor. 1840 Pain controlled, pt taking ice chip 1-2 ounces per hour. No nausea,no vomiting. Pt ambulated several times with one assist, well tolerated.  Call bell within reach

## 2017-12-11 NOTE — H&P
Lyndsay Rodriguez is a 39 y.o. female who comes into the office today after completing the entirety of the bariatric preoperative protocol satisfactorily. She has been struggling with obesity since childhood. She has tried a variety of unsupervised weight-loss attempts, but has yet to meet with lasting success. Today, the patient is Height: 5' 8.5\" (174 cm) tall, Weight: (!) 172.4 kg (380 lb) lbs for a Body mass index is 56.94 kg/(m^2). It is due to their severe obesity, which is further complicated by GERD  that the patient is now seeking out bariatric surgery, specifically, the gastric bypass. Past Medical History:   Diagnosis Date    Arthritis     Pseudotumor cerebri        Past Surgical History:   Procedure Laterality Date    HX APPENDECTOMY      HX HEENT      T and A       Current Facility-Administered Medications   Medication Dose Route Frequency Provider Last Rate Last Dose    ceFAZolin (ANCEF) 3 g in 0.9%  ml IVPB  3 g IntraVENous ONCE Del Renee MD        lactated Ringers infusion  75 mL/hr IntraVENous CONTINUOUS Del Renee MD 75 mL/hr at 12/11/17 0649 75 mL/hr at 12/11/17 0649    [START ON 12/12/2017] lactated Ringers infusion  75 mL/hr IntraVENous CONTINUOUS Blessing Do CRNA        famotidine (PEPCID) 20 mg tablet             famotidine (PF) (PEPCID) 20 mg/2 mL injection                Allergies   Allergen Reactions    Tetracycline Other (comments)     Increases symptoms of pseudo tumor       Social History   Substance Use Topics    Smoking status: Former Smoker     Quit date: 2/19/2017    Smokeless tobacco: Never Used    Alcohol use No       History reviewed. No pertinent family history.       ROS, positive where in bold:    General: fevers, chills, night sweats, fatigue, weight loss, weight gain    GI: abdominal pain, nausea, vomiting, change in bowel habits, hematochezia, melena, GERD    Integumentary: dermatitis or abnormal moles    HEENT:  visual changes, vertigo, epistaxis, dysphagia, hoarseness    Cardiac: chest pain, palpitations, hypertension, edema,  varicosities    Resp:  cough, shortness of breath, wheezing, hemoptysis, snoring, reactive airway disease    : hematuria, dysuria, frequency, urgency, nocturia, stress urinary incontinence    MSK: weakness, joint pain, arthritis    Endocrine: diabetes, thyroid disease, polyuria, polydipsia, polyphagia, poor wound healing, heat intolerance, cold intolerance    Lymph/Heme: anemia, bruising, history of blood transfusions    Neuro: dizziness, headache, fainting, seizures, stroke    Psychiatry:  Anxiety, depression, psychosis      Physical Exam:  Visit Vitals    /70 (BP 1 Location: Left arm, BP Patient Position: At rest)    Temp 98.1 °F (36.7 °C)    Resp 18    Ht 5' 8.5\" (1.74 m)    Wt (!) 172.4 kg (380 lb)    LMP 12/10/2017    SpO2 100%    BMI 56.94 kg/m2       General: Well developed, well nourished 39 y.o. female in no acute distress  ENMT: normocephalic, atraumatic mouth:clear, no overt lesions, oral mucosa pink and moist  Neck: supple, no masses, no adenopathy or carotid bruits, trachea midline  Skin: warm, smooth, dry and well perfused  Respiratory: clear to auscultation bilaterally, no wheeze, rhonchi or rales, excursions normal and symmetrical  Cardiovascular: Regular rate and rhythm, no murmurs, clicks, gallops or rubs, no edema or varicosities  Gastrointestinal: soft, nontender, nondistended, normoactive bowel sounds, no hernias, no hepatosplenomegaly, minimally palpable costal margins,  mixed distribution  Musculoskeletal: warm, well-perfused, no tenderness or swelling, normal gait/station  Neuro: sensation and strength grossly intact and symmetrical  Psych: alert and oriented to person, place and time    Impression:    Beryl Hand is a 39 y.o. female who is suffering from morbid obesity and its attendant comorbidities who would benefit from bariatric surgery.   We've discussed the restrictive and malabsorptive nature of the gastric bypass. The patient understands the likelihood of losing approximately 80% of their excess weight in 12 to 18 months. The patient also understands the risks including but not limited to bleeding, infection, need for reoperation, anastomotic ulcers, leaks and strictures, bowel obstruction secondary to adhesions and internal hernias, DVT, PE, heart attack, stroke, and death. Patient also understands risks of inadequate weight loss, excess weight loss, vitamin insufficiency, protein malnutrition, excess skin, and loss of hair. We have reviewed the components of a successful postoperative course including requirement for a high protein, low carbohydrate diet, 60 oz a day of zero calorie liquids, daily vitamin supplementation, daily exercise, regular follow-up, and participation in support groups. We will perform laparoscopic gastric bypass.

## 2017-12-11 NOTE — PERIOP NOTES
Called out to family waiting. Spoke with pt mother, Casey Chamberlain. Updated on progress of procedure and pt status.

## 2017-12-11 NOTE — ANESTHESIA PREPROCEDURE EVALUATION
Anesthetic History   No history of anesthetic complications            Review of Systems / Medical History  Patient summary reviewed and pertinent labs reviewed    Pulmonary  Within defined limits                 Neuro/Psych   Within defined limits           Cardiovascular  Within defined limits                Exercise tolerance: >4 METS     GI/Hepatic/Renal  Within defined limits              Endo/Other        Morbid obesity and arthritis     Other Findings   Comments:   Risk Factors for Postoperative nausea/vomiting:       History of postoperative nausea/vomiting? NO       Female? YES       Motion sickness? NO       Intended opioid administration for postoperative analgesia? YES      Smoking Abstinence  Current Smoker? NO  Elective Surgery? NO  Seen preoperatively by anesthesiologist or proxy prior to day of surgery? YES  Pt abstained from smoking 24 hours prior to anesthesia?  N/A           Physical Exam    Airway  Mallampati: II    Neck ROM: normal range of motion   Mouth opening: Diminished (comment)     Cardiovascular    Rhythm: regular  Rate: normal         Dental  No notable dental hx       Pulmonary  Breath sounds clear to auscultation               Abdominal  GI exam deferred       Other Findings            Anesthetic Plan    ASA: 3  Anesthesia type: general          Induction: Intravenous  Anesthetic plan and risks discussed with: Patient

## 2017-12-11 NOTE — ANESTHESIA POSTPROCEDURE EVALUATION
Post-Anesthesia Evaluation and Assessment    Patient: Morteza Olivier MRN: [id number]  SSN: [id number]    YOB: 1980  Age: 39 y.o. Sex: female       Cardiovascular Function/Vital Signs  Visit Vitals    /59    Pulse (!) 57    Temp 36.7 °C (98.1 °F)    Resp 14    Ht 5' 8.5\" (1.74 m)    Wt (!) 172.4 kg (380 lb)    SpO2 95%    BMI 56.94 kg/m2       Patient is status post general anesthesia for Procedure(s):  GASTRIC BYPASS . Nausea/Vomiting: None    Postoperative hydration reviewed and adequate. Pain:  Pain Scale 1: Visual (12/11/17 1013)  Pain Intensity 1: 0 (12/11/17 1013)   Managed    Neurological Status:   Neuro (WDL): Within Defined Limits (12/11/17 1013)   At baseline    Mental Status and Level of Consciousness: Arousable    Pulmonary Status:   O2 Device: Non-rebreather mask (12/11/17 1019)   Adequate oxygenation and airway patent    Complications related to anesthesia: None    Post-anesthesia assessment completed.  No concerns    Signed By: June Rincon MD     December 11, 2017

## 2017-12-12 LAB
ANION GAP SERPL CALC-SCNC: 13 MMOL/L (ref 3–18)
BUN SERPL-MCNC: 10 MG/DL (ref 7–18)
BUN/CREAT SERPL: 19 (ref 12–20)
CALCIUM SERPL-MCNC: 8.5 MG/DL (ref 8.5–10.1)
CHLORIDE SERPL-SCNC: 106 MMOL/L (ref 100–108)
CO2 SERPL-SCNC: 23 MMOL/L (ref 21–32)
CREAT SERPL-MCNC: 0.53 MG/DL (ref 0.6–1.3)
ERYTHROCYTE [DISTWIDTH] IN BLOOD BY AUTOMATED COUNT: 14.6 % (ref 11.6–14.5)
GLUCOSE SERPL-MCNC: 110 MG/DL (ref 74–99)
HCT VFR BLD AUTO: 34.7 % (ref 35–45)
HGB BLD-MCNC: 11.3 G/DL (ref 12–16)
MCH RBC QN AUTO: 28 PG (ref 24–34)
MCHC RBC AUTO-ENTMCNC: 32.6 G/DL (ref 31–37)
MCV RBC AUTO: 86.1 FL (ref 74–97)
PLATELET # BLD AUTO: 237 K/UL (ref 135–420)
PMV BLD AUTO: 11.3 FL (ref 9.2–11.8)
POTASSIUM SERPL-SCNC: 3.8 MMOL/L (ref 3.5–5.5)
RBC # BLD AUTO: 4.03 M/UL (ref 4.2–5.3)
SODIUM SERPL-SCNC: 142 MMOL/L (ref 136–145)
WBC # BLD AUTO: 12.7 K/UL (ref 4.6–13.2)

## 2017-12-12 PROCEDURE — 36415 COLL VENOUS BLD VENIPUNCTURE: CPT | Performed by: SURGERY

## 2017-12-12 PROCEDURE — 80048 BASIC METABOLIC PNL TOTAL CA: CPT | Performed by: SURGERY

## 2017-12-12 PROCEDURE — 74011250636 HC RX REV CODE- 250/636: Performed by: SURGERY

## 2017-12-12 PROCEDURE — 74011000250 HC RX REV CODE- 250: Performed by: SURGERY

## 2017-12-12 PROCEDURE — 74011258636 HC RX REV CODE- 258/636: Performed by: SURGERY

## 2017-12-12 PROCEDURE — 65270000029 HC RM PRIVATE

## 2017-12-12 PROCEDURE — 74011250637 HC RX REV CODE- 250/637: Performed by: SURGERY

## 2017-12-12 PROCEDURE — 85027 COMPLETE CBC AUTOMATED: CPT | Performed by: SURGERY

## 2017-12-12 PROCEDURE — 77030020254 HC SOL INJ D5LR LACTATED RINGER

## 2017-12-12 RX ORDER — OXYCODONE HYDROCHLORIDE 5 MG/1
5 TABLET ORAL
Qty: 20 TAB | Refills: 0 | Status: SHIPPED | OUTPATIENT
Start: 2017-12-12 | End: 2017-12-28 | Stop reason: ALTCHOICE

## 2017-12-12 RX ORDER — ENOXAPARIN SODIUM 100 MG/ML
40 INJECTION SUBCUTANEOUS DAILY
Qty: 7 SYRINGE | Refills: 0 | Status: SHIPPED
Start: 2017-12-13 | End: 2017-12-28 | Stop reason: ALTCHOICE

## 2017-12-12 RX ADMIN — ONDANSETRON 4 MG: 2 INJECTION INTRAMUSCULAR; INTRAVENOUS at 22:32

## 2017-12-12 RX ADMIN — ENOXAPARIN SODIUM 40 MG: 40 INJECTION SUBCUTANEOUS at 09:06

## 2017-12-12 RX ADMIN — OXYCODONE HYDROCHLORIDE 5 MG: 5 TABLET ORAL at 17:15

## 2017-12-12 RX ADMIN — OXYCODONE HYDROCHLORIDE 5 MG: 5 TABLET ORAL at 22:32

## 2017-12-12 RX ADMIN — ONDANSETRON 4 MG: 2 INJECTION INTRAMUSCULAR; INTRAVENOUS at 10:41

## 2017-12-12 RX ADMIN — FAMOTIDINE 20 MG: 10 INJECTION, SOLUTION INTRAVENOUS at 06:27

## 2017-12-12 RX ADMIN — ONDANSETRON 4 MG: 2 INJECTION INTRAMUSCULAR; INTRAVENOUS at 15:38

## 2017-12-12 RX ADMIN — ACETAMINOPHEN 650 MG: 325 TABLET, FILM COATED ORAL at 04:27

## 2017-12-12 RX ADMIN — OXYCODONE HYDROCHLORIDE 5 MG: 5 TABLET ORAL at 13:10

## 2017-12-12 RX ADMIN — ONDANSETRON 4 MG: 2 INJECTION INTRAMUSCULAR; INTRAVENOUS at 04:31

## 2017-12-12 RX ADMIN — OXYCODONE HYDROCHLORIDE 5 MG: 5 TABLET ORAL at 09:06

## 2017-12-12 RX ADMIN — OXYCODONE HYDROCHLORIDE 5 MG: 5 TABLET ORAL at 04:46

## 2017-12-12 RX ADMIN — SODIUM CHLORIDE, SODIUM LACTATE, POTASSIUM CHLORIDE, CALCIUM CHLORIDE AND DEXTROSE MONOHYDRATE 150 ML/HR: 5; 600; 310; 30; 20 INJECTION, SOLUTION INTRAVENOUS at 02:08

## 2017-12-12 RX ADMIN — FAMOTIDINE 20 MG: 10 INJECTION, SOLUTION INTRAVENOUS at 18:22

## 2017-12-12 RX ADMIN — Medication 1 MG: at 06:29

## 2017-12-12 NOTE — PROGRESS NOTES
1911 Received patient from [address] . Patient is alert and oriented x 4. Denies any pain at this time. 2300 Patient reporting no pain at this time resting with eyes closed. 0000 Dutton catheter removed as per order. Patient advised to call nursing  Before getting OOB, at least for now. 0400 Patient ambulated for the third time, after when noticing pain asked to go for  A walk, later tried some PRN pain medication. 3366 Noted that pain medication effective but additional required for complete relief.   0711 Bedside and Verbal shift change report given to Fidel Lux RN (oncoming nurse) by Isabela Pickett RN (offgoing nurse). Report included the following information SBAR, Procedure Summary, Intake/Output, MAR and Recent Results.

## 2017-12-12 NOTE — PROGRESS NOTES
Tolerating PO fluids. Pain is controlled. Post op diet progression discussed with patient. Patient to be discharged on a bariatric clear liquid diet. Patient verbalizes understanding of bariatric clear liquid and bariatric soft and moist diet. All of the patients questions and concerns have been addressed prior to discharge. Patient to follow up with surgeon in 7-14 days. Lovenox self injection instructions given. General Care after Surgery    1. No lifting over 15 lbs for 4 weeks. 2. No driving while taking the pain medication (approximately 7-10 days). 3. No tub baths, swimming or hot tubs until incisions are healed. (about 2 weeks)    4. You may shower. Clean incisions daily /gently with soap and check incisions for signs of infection:   Redness around incision   Swelling at site   Drainage with an foul odor (pus)   Increase tenderness around incision    5. Take your temperature and resting pulse in the morning and evening. Record on tracking form given to you. Call if your temperature is greater than 101 or your pulse rate is greater than 115.    6. Please contact your surgeon if you are having excessive abdominal pain (that lasts longer than 4 hours and does not improve with prescribed pain medication), vomiting or shortness of breath. 7. Get up and move - do not sit in one place for more than an hour. 8. You need to WALK (EXERCISE) for 30 minutes per day. (Walking around your house does not count)      a. Bike, treadmill and elliptical are OK  b. NO weight lifting or sit ups    9. If constipated take an adult dose of Miralax (available over the counter). Contact the doctors office if [place] lax doesn'tT help.     10.  You may swallow pills starting the day after surgery as long as they fit inside this [place]:                                            11.  Continue to use your incentive spirometer (breathing machine) for the next couple of weeks to help prevent pneumonia. Phone numbers: [facility] at [address] [facility] at [address] [place] phone #: [phone]  Cuate Gant phone# [phone]   Nasrin Farrell phone#: [phone]  Jules phone#: [phone]    Key Diet Principles Following Bariatric Surgery     1. Begin each meal with soft moist high protein foods (i.e. chicken, turkey, yogurt, tuna, eggs, cottage cheese, other fish and seafood). 2.  Consume a minimum of 64 oz. of fluid each day to prevent dehydration. No straws. 3.  No food and fluid together. Stop drinking 30 minutes before a meal.  You may begin fluids again 30 minutes after you finish a meal.    4.  Eat very slowly and chew all foods completely. 6.  Keep portions small. 7.  No simple sugars or high fat foods. No carbonated beverages. No Caffeine. 8.  Eat 3 meals per day. No skipping. Avoid snacking between meals. 9.  No alcohol. No Smoking. 10. Two [place] Complete Chewable vitamins each day. Take one in the morning and one at night. 11. 1500 mg Calcium Citrate per day in separate dosages      12. Vitamin D 3: 5000 IU taken per day. 13. Vitamin B-12:  Take 1000 mcg sublingually daily    14. Iron: 60 mg per day for women menstruating    15. Protein supplements of your choice. Must be low sugar (0-3 gm), low fat    (0-3 gm) and provide at least 35-40 gm of protein each day. You need a total  (food + supplements) of 60 - 70 grams of protein each day   16. Minimum of 30 minutes of physical activity daily. 16. Do not take NSAID or Steroids without your surgeons permission. [facility] Gastric Bypass and Sleeve Dietary Progression    Patient Name: S.H      Date of Surgery:  12/11    Ice Chips start once admitted on floor.      Begin Bariatric Clear Liquid Diet on: 12/12    Clear Liquid Diet: 64 oz. of fluid per day  o Low calorie, low sugar, non-carbonated beverages  - Water, Crystal Light, Propel Water, Sugar Free Jell-O, Sugar Free Popsicles, Bouillon  - Start protein supplement during this stage. (60-70 grams per day)  - Getting your fluid in and staying hydrated is your #1 priority! - The clear liquid diet will last for 7 days. Begin Bariatric Soft and Moist on: 12/20  - This stage of the diet will last for 5 weeks, unless otherwise instructed by your surgeon. - Begin:  1 week post-op   - End:  6 weeks post-op (or when you follow up with the Registered Dietitian)    - Soft, moist, high protein foods: 3 meals per day plus protein supplements. o   Portions should emphasize on soft protein. o Portions will be a MAXIMUM of:  o  1 ounce of solid food  o  2-3 ounces of cottage cheese and yogurt. o Protein supplements should be between meals and provide 30-40 grams per day during soft protein diet. o Continue to get 64 ounces of fluid in per day. - Protein foods that are ok on the Soft and Moist Diet include:  o Slow transition:  o 1st week on soft protein should focus on: Yogurt, cottage cheese, eggs, vegetarian refried beans, black beans, kidney beans, white beans. NO BAKED BEANS  o 2nd -4th  week on soft protein diet should focus on: yogurt, cottage cheese, eggs, canned tuna, canned chicken, tilapia, fish (needs to be soft enough to be cut up with a fork)  o 5th week on soft protein diet should focus on: Yogurt, cottage cheese, eggs, canned tuna, canned chicken, tilapia, fish, salmon, chicken breast, or turkey. Remember to continue to get 64 ounces of fluid daily on ALL Stages.     To be advanced to Bariatric Maintenance Stage of the bariatric diet, follow up with the dietitian 6 weeks post-op, around:                   TEMPERATURE/HEART RATE LOG  WEEK 1  Day Date Morning temperature Morning heart rate Evening temperature Evening heart rate   1        2        3        4        5        6        7          WEEK 2  Day Date Morning temperature Morning heart rate Evening temperature Evening heart rate   1        2        3        4        5        6        7          Instructions: Take your temperature and heart rate (pulse) twice a day for 14 days. Take both in the morning and evening at about the same time each day (when you wake up and before you go to bed when you are relaxed)  Please contact your doctors office if your:  ? Temperature is higher than 101°  ? Heart rate (pulse) is higher than 115 beats per minute    (normal heart rate is  beats per minute)    [facility]  [phone]  [place]:    [phone]        HOW TO TAKE YOUR HEART RATE (PULSE)       How to do it:  1. Turn your left hand so that your palm is face-up. 2. With the index and middle fingers of your right hand, draw a line from the base of your thumb to just below the crease in your wrist. Your fingers should nestle just to the left of the large tendon that pops up when you bend your wrist toward you. 3. DonT press too hard, that will make the pulse go away. Use gentle pressure. 4. Wait. It can take several seconds--and several micro-adjustments in the placement of your two fingers on your wrist--to find your pulse. Just keep moving your fingers down or up your wrist in small increments (and pausing for a few seconds) until you find it. 5. To take your pulse rate:  1. Find a watch with a second hand and place it on your right wrist or on the table next to your left hand. 2. After finding your pulse, count the number of beats for 20 seconds. 3. Multiply by 3 to get your heart rate, or beats per minute (or just count for 60 seconds for a math-free option). 4. Normal, resting heart rate is about  beats per minute. Remember to keep all you follow up appointments and to have your labs drawn. Hydration Prevents Dehydration  You are required by you surgeon to drink 48-64 ounces of fluid every day to prevent dehydration.  Dehydration is very serious and could require being admitted back to the [facility]. Pradeep Laureano can reach your fluid goal of 48-64 ounces per day by constantly sipping small amounts of fluids. Water should be your drink of choice at all times.     Signs and Symptoms of dehydration (5% fluid loss)   Fatigue (loss of energy)   Thirst, Dry Mouth   Dry Skin, Skin Flushing   Dark color urine   Increased Heart rate & Respirations   Muscle cramps   Headache   Nausea   Tingling of  the limbs  (10% Fluid Loss   Muscle Spasms   Vomiting   Racing Pulse   Shriveled Skin   Painful Urination   Confusion   Difficulty breathing   Seizures/Unconsciousness  If you suspect you may be dehydrated call the office first.

## 2017-12-12 NOTE — PROGRESS NOTES
Certified Amy Orozco provider rounded on Kaya Haro to provide education related to sleep apnea after chart review for risk factors. Risk factors include:  1. BMI exceeds 35.0: 56.9  2. Mallampati II  3. STOP BANG score 2  4. Amorita Sleepiness score 2    Provided patient with the following pamphlets:  1. What is Sleep Apnea  2. Sleep and Medical problems  3. Sleep & Your Heart  4. Common Sleep Problems for Older Adults  5. Tips For Sleep As You Age  10. Tips For Better Sleep In Older Adults    Patient Education:  1. Reviewed sleep hygiene & effects of poor sleep quality. 2. Reviewed relationship between sleep & heart health. 3. Reviewed relationship between sleep & age. 4. Reviewed relationship between sleep & weight: Patient recently had gastric bypass surgery. Patient stated that she has had a prior sleep study, conducted around 2008. The results were said to be negative. Recommendations:  1. Referral to sleep specialist for evaluation 9-12 months after surgery, if symptoms of poor sleep quality present. 2. Order baseline PSG testing to be arranged as an outpatient. 3. Follow up with sleep specialist for treatment and management.

## 2017-12-12 NOTE — DISCHARGE INSTRUCTIONS
DISCHARGE SUMMARY from Nurse    PATIENT INSTRUCTIONS:    After general anesthesia or intravenous sedation, for 24 hours or while taking prescription Narcotics:  · Limit your activities  · Do not drive and operate hazardous machinery  · Do not make important personal or business decisions  · Do  not drink alcoholic beverages  · If you have not urinated within 8 hours after discharge, please contact your surgeon on call. Report the following to your surgeon:  · Excessive pain, swelling, redness or odor of or around the surgical area  · Temperature over 100.5  · Nausea and vomiting lasting longer than 4 hours or if unable to take medications  · Any signs of decreased circulation or nerve impairment to extremity: change in color, persistent  numbness, tingling, coldness or increase pain  · Any questions    What to do at Home:  Recommended activity: Activity as tolerated,       Please give a list of your current medications to your Primary Care Provider. *  Please update this list whenever your medications are discontinued, doses are      changed, or new medications (including over-the-counter products) are added. *  Please carry medication information at all times in case of emergency situations. These are general instructions for a healthy lifestyle:    No smoking/ No tobacco products/ Avoid exposure to second hand smoke  Surgeon General's Warning:  Quitting smoking now greatly reduces serious risk to your health. Obesity, smoking, and sedentary lifestyle greatly increases your risk for illness    A healthy diet, regular physical exercise & weight monitoring are important for maintaining a healthy lifestyle    You may be retaining fluid if you have a history of heart failure or if you experience any of the following symptoms:  Weight gain of 3 pounds or more overnight or 5 pounds in a week, increased swelling in our hands or feet or shortness of breath while lying flat in bed.   Please call your doctor as soon as you notice any of these symptoms; do not wait until your next office visit. Recognize signs and symptoms of STROKE:    F-face looks uneven    A-arms unable to move or move unevenly    S-speech slurred or non-existent    T-time-call 911 as soon as signs and symptoms begin-DO NOT go       Back to bed or wait to see if you get better-TIME IS BRAIN. Warning Signs of HEART ATTACK     Call 911 if you have these symptoms:   Chest discomfort. Most heart attacks involve discomfort in the center of the chest that lasts more than a few minutes, or that goes away and comes back. It can feel like uncomfortable pressure, squeezing, fullness, or pain.  Discomfort in other areas of the upper body. Symptoms can include pain or discomfort in one or both arms, the back, neck, jaw, or stomach.  Shortness of breath with or without chest discomfort.  Other signs may include breaking out in a cold sweat, nausea, or lightheadedness. Don't wait more than five minutes to call 911 - MINUTES MATTER! Fast action can save your life. Calling 911 is almost always the fastest way to get lifesaving treatment. Emergency Medical Services staff can begin treatment when they arrive -- up to an hour sooner than if someone gets to the hospital by car. The discharge information has been reviewed with the patient. The patient verbalized understanding. Discharge medications reviewed with the patient and appropriate educational materials and side effects teaching were provided.   ___________________________________________________________________________________________________________________________________  Discharge Diet:  Clear Liquid Bariatric Diet for 7 days, then soft moist protein for 5 weeks      Discharge Medications:   *All medications as per Medicatoin Reconciliation Form*    [place] Complete Chewable vitamins, 2 orally daily for life  Calcium Citrate 2000mg orally daily for life  Vitamin B12 1000micrograms sublingual daily for life  Oxycodone 5mg tab, 1-2 by mouth every 4-6 hours as needed for pain not controlled with Tylenol  Enoxaparin (Lovenox) 40mg sub-Q daily for 7 days    Local wound care with daily showers, keep wounds clean and dry    Activity: as desired, no lifting greater than 15lbs or situps for 30 days    Special Instructions:   No driving until activity is not influenced by incisional pain and off narcotics   No bath or hot tub until wounds are healed   Pulse and temperature twice daily for 10 days   Notify [employer] [facility] for a Temp >100.5 or Pulse>115    Followup with surgeon in 2 weeks

## 2017-12-12 NOTE — PROGRESS NOTES
Awake sitting up in bed she has c/o pain during the night relieved this am with dilaudid. She is walking and pulling about 500 on I.S. She will work on I.S today. She is voiding. Patient was educated on progression of diet this AM. Goal of 4 ounces per hour with one ounce being protein was clearly understood. Patient was instructed to go slow with small sips to reach goal. Patient given a report card to record intake. Education completed on I.S use and to ambulate in mercer at least 4 times. Will follow up and check progression.

## 2017-12-12 NOTE — PROGRESS NOTES
Surgery Progress Note    12/12/2017    Admit Date: 12/11/2017    Subjective:     Patient has complaints of pain Pain is controlled with current regimen. Patient has been ambulating in halls. She reports no nausea and no vomiting and is tolerating ice chips well. Objective:     Blood pressure 134/71, pulse 62, temperature 97.8 °F (36.6 °C), resp. rate 18, height 5' 8.5\" (1.74 m), weight (!) 172.4 kg (380 lb), last menstrual period 12/10/2017, SpO2 95 %. 12/12 0701 - 12/12 1900  In: 0   Out: 500 [Urine:500]    12/10 1901 - 12/12 0700  In: 8925 [P.O.:330; I.V.:4385]  Out: 1725 [Urine:1700]    EXAM: GENERAL: alert, pleasant, no distress   HEART: regular rate and rhythm   LUNGS: clear to auscultation   ABDOMEN:  Soft, obese, appropriately tender, non distended, incisions clean,dry, no erythema or drainage    EXTREMITIES: warm, well perfused    Data Review    Recent Results (from the past 24 hour(s))   CBC W/O DIFF    Collection Time: 12/12/17  4:10 AM   Result Value Ref Range    WBC 12.7 4.6 - 13.2 K/uL    RBC 4.03 (L) 4.20 - 5.30 M/uL    HGB 11.3 (L) 12.0 - 16.0 g/dL    HCT 34.7 (L) 35.0 - 45.0 %    MCV 86.1 74.0 - 97.0 FL    MCH 28.0 24.0 - 34.0 PG    MCHC 32.6 31.0 - 37.0 g/dL    RDW 14.6 (H) 11.6 - 14.5 %    PLATELET 012 757 - 698 K/uL    MPV 11.3 9.2 - 58.3 FL   METABOLIC PANEL, BASIC    Collection Time: 12/12/17  4:10 AM   Result Value Ref Range    Sodium 142 136 - 145 mmol/L    Potassium 3.8 3.5 - 5.5 mmol/L    Chloride 106 100 - 108 mmol/L    CO2 23 21 - 32 mmol/L    Anion gap 13 3.0 - 18 mmol/L    Glucose 110 (H) 74 - 99 mg/dL    BUN 10 7.0 - 18 MG/DL    Creatinine 0.53 (L) 0.6 - 1.3 MG/DL    BUN/Creatinine ratio 19 12 - 20      GFR est AA >60 >60 ml/min/1.73m2    GFR est non-AA >60 >60 ml/min/1.73m2    Calcium 8.5 8.5 - 10.1 MG/DL       Assessment:   Brett Owens is a 39 y.o. female, postop day 1 status post laparoscopic gastric bypass surgery.   Condition: good    Plan:   -D/C telemetry  -Ambulate every four hours  -Oxycodone 5mg 1-2 tabs po every 4-6 hour prn pain uncontrolled by tylenol  -Advance to Clear liquid Gastric Bypass Diet, if able to tolerate clear liquid diet 4oz per hour one of which being a protein supplement, will discharge home later today

## 2017-12-12 NOTE — PROGRESS NOTES
0900  Was up on the chair, doing her triflo, per pt  Pain is dull sore at incisional site, Jacqueline, coordinator  at bedside, okay to give Katy  And  scape Tylenol at this time, medicated for pain,  Teaching for  Lovenox done, verbalized understanding. 1200  Been up  Walking in the hallway, pain unde control  With Katy, no nausea    1500  Voiding well, not meeting criteria at this time. [facility] well, been up on the chair almost of the time, not met criteria, no nausea. 1900  No nausea, no pain.

## 2017-12-13 VITALS
HEIGHT: 69 IN | SYSTOLIC BLOOD PRESSURE: 135 MMHG | BODY MASS INDEX: 43.4 KG/M2 | DIASTOLIC BLOOD PRESSURE: 85 MMHG | RESPIRATION RATE: 18 BRPM | WEIGHT: 293 LBS | TEMPERATURE: 98.1 F | OXYGEN SATURATION: 94 % | HEART RATE: 63 BPM

## 2017-12-13 PROCEDURE — 74011000250 HC RX REV CODE- 250: Performed by: SURGERY

## 2017-12-13 PROCEDURE — 74011250636 HC RX REV CODE- 250/636: Performed by: SURGERY

## 2017-12-13 PROCEDURE — 74011250637 HC RX REV CODE- 250/637: Performed by: SURGERY

## 2017-12-13 PROCEDURE — 77030020254 HC SOL INJ D5LR LACTATED RINGER

## 2017-12-13 RX ADMIN — ACETAMINOPHEN 650 MG: 325 TABLET, FILM COATED ORAL at 09:25

## 2017-12-13 RX ADMIN — ENOXAPARIN SODIUM 40 MG: 40 INJECTION SUBCUTANEOUS at 09:25

## 2017-12-13 RX ADMIN — ONDANSETRON 4 MG: 2 INJECTION INTRAMUSCULAR; INTRAVENOUS at 05:27

## 2017-12-13 RX ADMIN — OXYCODONE HYDROCHLORIDE 5 MG: 5 TABLET ORAL at 04:13

## 2017-12-13 RX ADMIN — FAMOTIDINE 20 MG: 10 INJECTION, SOLUTION INTRAVENOUS at 07:07

## 2017-12-13 RX ADMIN — ONDANSETRON 4 MG: 2 INJECTION INTRAMUSCULAR; INTRAVENOUS at 09:25

## 2017-12-13 NOTE — ROUTINE PROCESS
Bedside and Verbal shift change report given to Prudencio Cdae   (oncoming nurse) by Raj Bojorquez RN   (offgoing nurse). Report included the following information SBAR, Kardex, Intake/Output and MAR.

## 2017-12-13 NOTE — PROGRESS NOTES
1939 Received patient from [address] . Patient is alert and oriented x 4.  2300 Patient ambulated down 600' Tolerated well.  0300 Patient resting with eyes closed. 0630 Ambulated with patient x 600' tolerated well.  0645 Noted that patient made/accomplished goal of 3 cups x 3 hours. 0700 Bedside and Verbal shift change report given to Angus Nolan RN (oncoming nurse) by Sharyn Christianson RN (offgoing nurse). Report included the following information SBAR, Kardex, Procedure Summary, Intake/Output, MAR and Recent Results.

## 2017-12-13 NOTE — PROGRESS NOTES
1000  Ambulated in the hallway 3x, sitting on the chair most of the time, Tylenol given, had offered Katy earlier. 1100  IV site no redness and no pain but getting  Puffy, per Jacqueline, coordinator,okay not to start any more the IV, offered cold compress but pt does not want, elevated on a pillow. 1400  Discharge in good spirit, instructions given by  Charity George looks clean, pain under control, meet criteria no nausea,left hand from and old IV site remain puffy  But no redness noted and no pain, instructed her to place ice pack and elevate with a pillow.

## 2017-12-13 NOTE — DISCHARGE SUMMARY
Bariatric Surgery Discharge Progress Note    Admission Date: 12/11/2017    Discharge Date:  12/13/17    Admission Diagnosis:    Clinically severe Obesity    Comorbidities:  GERD    Discharge Diagnosis:     Clinically Severe Obesity, s/p laparoscopic gastric bypass surgery with comorbidities as listed above    Procedures:   laparoscopic gastric bypass surgery    Postop Complications: none    Hospital Course:  Patient was admitted on 12/11/2017 for scheduled bariatric surgery. Operation was without significant complication. Patient admitted to the floor postoperatively, monitored as per protocol. Diet sequentially advanced beginning POD 1, pain medications transitioned to oral during the hospital course. At the time of discharge, the patient is afebrile, vital signs stable, tolerating a clear liquid diet with protein supplementation, voiding spontaneously, ambulatory with adequate pain control with oral medications and clear surgical sites without evidence of infection.     Discharge Diet:  Clear Liquid Bariatric Diet for 7 days, then soft moist protein diet for 5 weeks    Discharge Medications:   *All medications as per Medical Reconciliation Form\"    Flintstones Complete Chewable vitamins, 2 orally daily for life  Calcium Citrate 2000mg orally daily for life  Vitamin B12 1000micrograms sublingual daily for life  Oxycodone 5mg tab 1-2 by mouth every 4-6 hours as needed for pain uncontrolled with Tylenol  Enoxaparin (Lovenox) 40mg sub-Q daily for 7 days    Discharge disposition: home      Local wound care with daily showers, keep wounds clean and dry    Activity: as desired, no lifting greater than 15lbs or situps for 30 days    Special Instructions:   No driving until activity is not influenced by incisional pain and off narcotics   No bath or hot tub until wounds are healed   Pulse and temperature twice daily for 10 days   Notify [facility] for a Temp >100.5 or Pulse>115    Followup with surgeon in 2 weeks

## 2017-12-13 NOTE — PROGRESS NOTES
Certified Amy Orozco provider rounded on Aaron Salinas to provide education related to sleep apnea after chart review for risk factors. Risk factors include:  1. BMI exceeds 35.0: 56.  2. Mallampati II    Provided patient with the following pamphlets:  1. What is Sleep Apnea  2. Sleep and Medical problems  3. Sleep & Your Heart  4. Common Sleep Problems for Older Adults  5. Tips For Sleep As You Age  10. Tips For Better Sleep In Older Adults      Recommendations:  1. Referral to sleep specialist for evaluation 9-12 months after surgery, if symptoms of poor sleep quality present. 2. Order baseline PSG testing to be arranged as an outpatient. 3. Follow up with sleep specialist for treatment and management.

## 2017-12-13 NOTE — PROGRESS NOTES
Care Management Interventions  PCP Verified by CM: Yes  Mode of Transport at Discharge:  Other (see comment)  Transition of Care Consult (CM Consult): Discharge Planning  MyChart Signup: No  Discharge Durable Medical Equipment: No  Physical Therapy Consult: No  Occupational Therapy Consult: No  Speech Therapy Consult: No  Current Support Network: Lives Alone  Confirm Follow Up Transport: Family  Plan discussed with Pt/Family/Caregiver: Yes  Discharge Location  Discharge Placement: Home

## 2017-12-13 NOTE — PROGRESS NOTES
Patient awake in bed. Tolerating fluids. Educated on PO goals for today. Will continue to educated and provide support.

## 2017-12-18 ENCOUNTER — TELEPHONE (OUTPATIENT)
Dept: MEDSURG UNIT | Age: 37
End: 2017-12-18

## 2017-12-18 NOTE — TELEPHONE ENCOUNTER
Post Op Follow Up Call-  l                                                              Surgery                                                                                                   Pain - Pain rated      0/10          . Check V. S  -    HR 84 T 98.5  Lovenox  -   yes         Actigall -  N/A                 Fluid Intake  - 56 oinces  Protein Supplements - 50 grams  Clear Liquids/Full Liquids   clears  Multi Vitamins BID- yes  B12       yes                    Calcium Citrate    ordered  Ambulation/Walking 27 minutes  B. M -    yes                    Nausea   Few times when taking calcium                 Vomiting      no            Reccomend - increase fluids.  Exercise 30 plus minutes a day      Follow Up Appointment   Date Dec 27_____Time______

## 2017-12-28 ENCOUNTER — OFFICE VISIT (OUTPATIENT)
Dept: SURGERY | Age: 37
End: 2017-12-28

## 2017-12-28 VITALS
HEIGHT: 68 IN | OXYGEN SATURATION: 96 % | WEIGHT: 293 LBS | BODY MASS INDEX: 44.41 KG/M2 | TEMPERATURE: 97.1 F | DIASTOLIC BLOOD PRESSURE: 100 MMHG | SYSTOLIC BLOOD PRESSURE: 151 MMHG | HEART RATE: 105 BPM | RESPIRATION RATE: 19 BRPM

## 2017-12-28 DIAGNOSIS — K91.2 POSTOPERATIVE MALABSORPTION: Primary | ICD-10-CM

## 2017-12-28 NOTE — MR AVS SNAPSHOT
Visit Information Date & Time Provider Department Dept. Phone Encounter #  
 12/28/2017 10:30 AM Shraddha Mcintosh MD [facility] [phone] [id number] Upcoming Health Maintenance Date Due DTaP/Tdap/Td series (1 - Tdap) 12/19/2000 PAP AKA CERVICAL CYTOLOGY 12/19/2000 Influenza Age 5 to Adult 8/1/2017 Allergies as of 12/28/2017  Review Complete On: 12/28/2017 By: Jackson Lemus LPN Severity Noted Reaction Type Reactions Tetracycline  05/19/2017    Other (comments) Increases symptoms of pseudo tumor Current Immunizations  Never Reviewed No immunizations on file. Not reviewed this visit You Were Diagnosed With   
  
 Codes Comments Postoperative malabsorption    -  Primary ICD-10-CM: K91.2 ICD-9-CM: 579.3 Vitals BP Pulse Temp Resp Height(growth percentile) Weight(growth percentile) (!) 151/100 (BP 1 Location: Right arm, BP Patient Position: Sitting) (!) 105 97.1 °F (36.2 °C) (Oral) 19 5' 8\" (1.727 m) (!) 358 lb (162.4 kg) LMP SpO2 BMI OB Status Smoking Status 12/10/2017 96% 54.43 kg/m2 Unknown Former Smoker BMI and BSA Data Body Mass Index Body Surface Area 54.43 kg/m 2 2.79 m 2 Your Updated Medication List  
  
   
This list is accurate as of: 12/28/17 10:58 AM.  Always use your most recent med list.  
  
  
  
  
 CALCIUM 600 + D 600-125 mg-unit Tab Generic drug:  calcium-cholecalciferol (d3) Take  by mouth. Iron 325 mg (65 mg iron) tablet Generic drug:  ferrous sulfate Take  by mouth Daily (before breakfast). multivitamin tablet Commonly known as:  ONE A DAY Take 1 Tab by mouth daily. TYLENOL 325 mg tablet Generic drug:  acetaminophen Take  by mouth every four (4) hours as needed for Pain. VITAMIN D3 5,000 unit Tab tablet Generic drug:  cholecalciferol (VITAMIN D3) Take  by mouth daily.   
  
  
  
  
To-Do List   
 01/24/2018 8:00 AM  
  Appointment with [facility]ETITIAN at [facility]  ([phone]) Patient Instructions If you have any questions or concerns about today's appointment, the verbal and/or written instructions you were given for follow up care, please call our office at [phone]. [facility] - [address], [address] 
 
[phone] office [phone] Introducing [facility]! [facility] introduces WebPay patient portal. Now you can access parts of your medical record, email your doctor's office, and request medication refills online. 1. In your internet browser, go to [email or web address] 2. Click on the First Time User? Click Here link in the Sign In box. You will see the New Member Sign Up page. 3. Enter your WebPay Access Code exactly as it appears below. You will not need to use this code after youve completed the sign-up process. If you do not sign up before the expiration date, you must request a new code. · WebPay Access Code: [email or web address] Expires: 2/6/2018  1:22 PM 
 
4. Enter the last four digits of your Social Security Number (xxxx) and Date of Birth (mm/dd/yyyy) as indicated and click Submit. You will be taken to the next sign-up page. 5. Create a WebPay ID. This will be your WebPay login ID and cannot be changed, so think of one that is secure and easy to remember. 6. Create a WebPay password. You can change your password at any time. 7. Enter your Password Reset Question and Answer. This can be used at a later time if you forget your password. 8. Enter your e-mail address. You will receive e-mail notification when new information is available in [address] 9. Click Sign Up. You can now view and download portions of your medical record. 10. Click the Download Summary menu link to download a portable copy of your medical information. If you have questions, please visit the Frequently Asked Questions section of the [employer] website. Remember, [employer] is NOT to be used for urgent needs. For medical emergencies, dial 911. Now available from your iPhone and Android! Please provide this summary of care documentation to your next provider. Your primary care clinician is listed as NONE. If you have any questions after today's visit, please call [phone].

## 2017-12-28 NOTE — PROGRESS NOTES
Patient presents for post op gastric bypass on 12/11/2017. 1. Have you been to the ER, urgent care clinic since your last visit? Hospitalized since your last visit? No    2. Have you seen or consulted any other health care providers outside of the [address] [place] [address] since your last visit? Include any pap smears or colon screening.  No

## 2017-12-28 NOTE — PATIENT INSTRUCTIONS
If you have any questions or concerns about today's appointment, the verbal and/or written instructions you were given for follow up care, please call our office at [phone].     [facility] - [address]    [phone] office  [phone]

## 2017-12-28 NOTE — PROGRESS NOTES
Subjective:      Morteza Olivier is a 44 y.o. female is now 2 weeks status post laparoscopic gastric bypass surgery. Doing well overall. Currently on a soft moist diet without difficulty. Taking in 50-55 oz water daily. Sources of protein include supplements, turkey, beans and yogurt and cottage cheese. 30 min of activity 7 days a week, including walking. Bowel movements are alternating constipation and regularity. She took a dose of miralax last week which then resulted in diarrhea. She has not gone in 3 days. The patient is not having any pain. She lifted something a bit heavy on Nubia day and since then has had a dull ache at incisions. The patient is compliant with multivitamins, calcium, Vit D and B12 supplements. Weight Loss Metrics 12/28/2017 12/11/2017 12/7/2017 11/8/2017 11/8/2017 8/10/2017 8/10/2017   Pre op / Initial Wt - - - 381 - 391 -   Today's Wt 358 lb - 380 lb - 381 lb - 391 lb   BMI 54.43 kg/m2 56.94 kg/m2 - - 56.26 kg/m2 - 57.74 kg/m2   Ideal Body Wt - - - 146 - 146 -   Excess Body Wt - - - 235 - 245 -   Goal Wt - - - 193 - 195 -   Wt loss to date - - - 0 - 0 -   % Wt Loss - - - 0 - 0 -   80% EBW - - - 188 - 196 -       Body mass index is 54.43 kg/(m^2). Comorbidities:    Hypertension: not applicable  Diabetes: not applicable  Obstructive Sleep Apnea: not applicable  Hyperlipidemia: not applicable  Stress Urinary Incontinence: not applicable  Gastroesophageal Reflux: improved  Weight related arthropathy:not applicable        Past Medical History:   Diagnosis Date    Arthritis     Pseudotumor cerebri        Past Surgical History:   Procedure Laterality Date    HX APPENDECTOMY      HX HEENT      T and A       Current Outpatient Prescriptions   Medication Sig Dispense Refill    acetaminophen (TYLENOL) 325 mg tablet Take  by mouth every four (4) hours as needed for Pain.  cholecalciferol, VITAMIN D3, (VITAMIN D3) 5,000 unit tab tablet Take  by mouth daily.       ferrous sulfate (IRON) 325 mg (65 mg iron) tablet Take  by mouth Daily (before breakfast).  multivitamin (ONE A DAY) tablet Take 1 Tab by mouth daily.  calcium-cholecalciferol, d3, (CALCIUM 600 + D) 600-125 mg-unit tab Take  by mouth.  enoxaparin (LOVENOX) 40 mg/0.4 mL 0.4 mL by SubCUTAneous route daily. 7 Syringe 0    oxyCODONE IR (ROXICODONE) 5 mg immediate release tablet Take 1 Tab by mouth every four (4) hours as needed. Max Daily Amount: 30 mg. 20 Tab 0       Allergies   Allergen Reactions    Tetracycline Other (comments)     Increases symptoms of pseudo tumor         Objective:     Visit Vitals    BP (!) 151/100 (BP 1 Location: Right arm, BP Patient Position: Sitting)    Pulse (!) 105    Temp 97.1 °F (36.2 °C) (Oral)    Resp 19    Ht 5' 8\" (1.727 m)    Wt (!) 162.4 kg (358 lb)    LMP 12/10/2017    SpO2 96%    BMI 54.43 kg/m2       General:  alert, cooperative, no distress, appears stated age   Chest: lungs clear to auscultation, no accessory muscle use   Cor:   Regular rate and rhythm   Abdomen: soft, bowel sounds active, non-tender   Incisions:   healing well, no drainage, no erythema, no hernia, no seroma, no swelling, no dehiscence, incision well approximated       Labs: none new    Assessment:     Doing well postoperatively.  Do not feel any evidence of hernia    Plan:     Increase activity to the goal of 30 minutes daily, Follow up labs as ordered, Increase fluids, Follow up with Registered Dietician and Continue MVI/Ca/B12 supplementation  Encouraged to attend support group  Follow up in 3 months

## 2018-01-08 ENCOUNTER — TELEPHONE (OUTPATIENT)
Dept: SURGERY | Age: 38
End: 2018-01-08

## 2018-01-08 NOTE — TELEPHONE ENCOUNTER
Patient called in and informed that she will be going into urgent care due to a cold that she feels has gone into her chest. She wanted to know if there were any restrictions on any types of medications she can or can not take. I informed her that the only restrictions would be any NSAID medications and that if she still has her Gastric bypass paperwork to take it in with her to inform them of the size pill she can take, that if they do not have a pill that size they may offer her a liquid.

## 2018-01-24 ENCOUNTER — DOCUMENTATION ONLY (OUTPATIENT)
Dept: BARIATRICS/WEIGHT MGMT | Age: 38
End: 2018-01-24

## 2018-01-24 ENCOUNTER — HOSPITAL ENCOUNTER (OUTPATIENT)
Dept: BARIATRICS/WEIGHT MGMT | Age: 38
Discharge: HOME | End: 2018-01-24

## 2018-01-24 NOTE — PROGRESS NOTES
[facility]  POST-OP NUTRITION FOLLOW UP    Patient's Name: Nellie Montesinos  YOB: 1980  Surgery Date: 17    Procedure: Gastric Bypass  Surgeon: Dr. Gabi Ivy    Height: 5 f 8   Pre-Op Weight: 381   Current Weight: 341  Weight Lost: 40  BMI:  51.9    Attendance of support group:   When:   Why not:     Complications  Readmittance: None  Reoperations: None  Complications: Not surgery-related. She has a sinus infection, which is causing drainage and making her nauseated. IV Fluids: None  ER Trips: None    Problem Areas:   Nausea: Yes, but not prior to her sinus infection. Vomitin x:    Dumping Syndrome: None  Inadequate Protein: None  Inadequate Fluids: Yes, in the last week since developing this sinus infection. Food Intolerance: None  Hunger: None  Constipation: Yes. Patient states she tried Miralax, which helped, but then states it overworked and she had diarrhea for a day and a half. Eating 3 Meals/Day: In the last week, no, but prior to that yes  Portion Size at Meals: 1 ounces   Protein from Food:     Foods being consumed:  Breakfast: Time: 8:00-8:30 am:  Premier protein shake. On weekends, she may have an egg. Lunch: Time: 12:00 pm:   Yogurt. States she does lentils. May do tuna. Dinner:  Time: 5:00 pm:   Same as lunch  In-between eating: None    Length of time for meals: 20-30 minutes  food/fluids: 30/30    Fluids: Currently at 40-50 oz/day, but up to last week, she was getting 60-70 ounces per day   Types of Fluids: Water, Crystal Light packets    MVI: flintstones  Number/Day: bid Taken Separately: yes    Calcium: Bariatric Advantage  chews  Calcium Dosing: tid  Taken Separately: yes    Vitamin B12: sublingual Vitamin B12 Dosin    Vitamin D:    Vitamin D dosin iu    Iron: Not taking right now. Per Dr. Gabi Ivy, patient will see her blood work and go from there.   Iron dosing:      Protein Supplement: Premier   Grams of Protein: 30   Mixed with:    Splitting Protein Drink in 1/2:    Timing of Protein Drinks:   Patient is taking 7 days a week. Exercise (FITT): Walking, 20-30 minutes, 5-7 days a week. Comments:    Patient is doing well at 6 weeks post op. She states she has a sinus infection and due to the drainage, this has caused some nausea and the desire to not eat or drink. She states in the last week, she has gone from 70 ounces of fluid per day to 40-50 ounces. She is continuously trying to push her fluid to work towards the 64 ounces again. She is eating 3 meals a day and sticking to soft protein. She is taking her vitamins, sometimes only getting calcium in bid. She is walking 5 days a week. Reinforced protocol. .  She is going to call the office to see what she can take for a sinus infection. Patient was educated on the importance of eating meat and vegetables only. I have talked with patient about the effects of carbohydrates, not only from a weight management perspective, but also what effects it could have on their blood sugar and what reactive hypoglycemia is.         Diet Follow Up:  9 month class scheduled for: September 18, 2018 at 3:15 pm    [address]add[address]    1/24/2018

## 2018-01-25 ENCOUNTER — TELEPHONE (OUTPATIENT)
Dept: SURGERY | Age: 38
End: 2018-01-25

## 2018-03-26 ENCOUNTER — TELEPHONE (OUTPATIENT)
Dept: SURGERY | Age: 38
End: 2018-03-26

## 2018-03-26 DIAGNOSIS — E66.01 MORBID OBESITY DUE TO EXCESS CALORIES (HCC): Primary | ICD-10-CM

## 2018-03-27 ENCOUNTER — HOSPITAL ENCOUNTER (OUTPATIENT)
Dept: LAB | Age: 38
Discharge: HOME | End: 2018-03-27
Payer: COMMERCIAL

## 2018-03-27 LAB
25(OH)D3 SERPL-MCNC: 36.4 NG/ML (ref 30–100)
ALBUMIN SERPL-MCNC: 3.6 G/DL (ref 3.4–5)
ALBUMIN/GLOB SERPL: 1.1 {RATIO} (ref 0.8–1.7)
ALP SERPL-CCNC: 95 U/L (ref 45–117)
ALT SERPL-CCNC: 17 U/L (ref 13–56)
ANION GAP SERPL CALC-SCNC: 12 MMOL/L (ref 3–18)
AST SERPL-CCNC: 12 U/L (ref 15–37)
BASOPHILS # BLD: 0 K/UL (ref 0–0.06)
BASOPHILS NFR BLD: 0 % (ref 0–2)
BILIRUB SERPL-MCNC: 0.6 MG/DL (ref 0.2–1)
BUN SERPL-MCNC: 12 MG/DL (ref 7–18)
BUN/CREAT SERPL: 22 (ref 12–20)
CALCIUM SERPL-MCNC: 9 MG/DL (ref 8.5–10.1)
CHLORIDE SERPL-SCNC: 103 MMOL/L (ref 100–108)
CO2 SERPL-SCNC: 26 MMOL/L (ref 21–32)
CREAT SERPL-MCNC: 0.54 MG/DL (ref 0.6–1.3)
DIFFERENTIAL METHOD BLD: ABNORMAL
EOSINOPHIL # BLD: 0.2 K/UL (ref 0–0.4)
EOSINOPHIL NFR BLD: 2 % (ref 0–5)
ERYTHROCYTE [DISTWIDTH] IN BLOOD BY AUTOMATED COUNT: 15.7 % (ref 11.6–14.5)
FERRITIN SERPL-MCNC: 104 NG/ML (ref 8–388)
FOLATE SERPL-MCNC: 15.6 NG/ML (ref 3.1–17.5)
GLOBULIN SER CALC-MCNC: 3.2 G/DL (ref 2–4)
GLUCOSE SERPL-MCNC: 74 MG/DL (ref 74–99)
HCT VFR BLD AUTO: 41 % (ref 35–45)
HGB BLD-MCNC: 13.8 G/DL (ref 12–16)
IRON SERPL-MCNC: 53 UG/DL (ref 50–175)
LYMPHOCYTES # BLD: 1.4 K/UL (ref 0.9–3.6)
LYMPHOCYTES NFR BLD: 19 % (ref 21–52)
MCH RBC QN AUTO: 30.5 PG (ref 24–34)
MCHC RBC AUTO-ENTMCNC: 33.7 G/DL (ref 31–37)
MCV RBC AUTO: 90.5 FL (ref 74–97)
MONOCYTES # BLD: 0.5 K/UL (ref 0.05–1.2)
MONOCYTES NFR BLD: 7 % (ref 3–10)
NEUTS SEG # BLD: 5.5 K/UL (ref 1.8–8)
NEUTS SEG NFR BLD: 72 % (ref 40–73)
PLATELET # BLD AUTO: 223 K/UL (ref 135–420)
PMV BLD AUTO: 12.9 FL (ref 9.2–11.8)
POTASSIUM SERPL-SCNC: 3.9 MMOL/L (ref 3.5–5.5)
PROT SERPL-MCNC: 6.8 G/DL (ref 6.4–8.2)
RBC # BLD AUTO: 4.53 M/UL (ref 4.2–5.3)
SODIUM SERPL-SCNC: 141 MMOL/L (ref 136–145)
VIT B12 SERPL-MCNC: 1603 PG/ML (ref 211–911)
WBC # BLD AUTO: 7.6 K/UL (ref 4.6–13.2)

## 2018-03-27 PROCEDURE — 36415 COLL VENOUS BLD VENIPUNCTURE: CPT | Performed by: SURGERY

## 2018-03-27 PROCEDURE — 82306 VITAMIN D 25 HYDROXY: CPT | Performed by: SURGERY

## 2018-03-27 PROCEDURE — 82746 ASSAY OF FOLIC ACID SERUM: CPT | Performed by: SURGERY

## 2018-03-27 PROCEDURE — 80053 COMPREHEN METABOLIC PANEL: CPT | Performed by: SURGERY

## 2018-03-27 PROCEDURE — 85025 COMPLETE CBC W/AUTO DIFF WBC: CPT | Performed by: SURGERY

## 2018-03-27 PROCEDURE — 82728 ASSAY OF FERRITIN: CPT | Performed by: SURGERY

## 2018-03-27 PROCEDURE — 83540 ASSAY OF IRON: CPT | Performed by: SURGERY

## 2018-03-27 PROCEDURE — 84425 ASSAY OF VITAMIN B-1: CPT | Performed by: SURGERY

## 2018-03-29 ENCOUNTER — OFFICE VISIT (OUTPATIENT)
Dept: SURGERY | Age: 38
End: 2018-03-29

## 2018-03-29 VITALS
BODY MASS INDEX: 44.41 KG/M2 | RESPIRATION RATE: 16 BRPM | HEIGHT: 68 IN | WEIGHT: 293 LBS | OXYGEN SATURATION: 98 % | HEART RATE: 79 BPM | TEMPERATURE: 97.7 F | DIASTOLIC BLOOD PRESSURE: 83 MMHG | SYSTOLIC BLOOD PRESSURE: 117 MMHG

## 2018-03-29 DIAGNOSIS — K91.2 POSTOPERATIVE MALABSORPTION: ICD-10-CM

## 2018-03-29 DIAGNOSIS — E66.01 MORBID OBESITY DUE TO EXCESS CALORIES (HCC): Primary | ICD-10-CM

## 2018-03-29 LAB — VIT B1 BLD-SCNC: 141.1 NMOL/L (ref 66.5–200)

## 2018-03-29 RX ORDER — LANOLIN ALCOHOL/MO/W.PET/CERES
1000 CREAM (GRAM) TOPICAL DAILY
COMMUNITY
End: 2020-01-13 | Stop reason: ALTCHOICE

## 2018-03-29 NOTE — PATIENT INSTRUCTIONS
If you have any questions or concerns about today's appointment, the verbal and/or written instructions you were given for follow up care, please call our office at [phone].     [facility] - [address]    [phone] office  [phone]

## 2018-03-29 NOTE — PROGRESS NOTES
1. Have you been to the ER, urgent care clinic since your last visit? Hospitalized since your last visit? No    2. Have you seen or consulted any other health care providers outside of the [address] [place] [address] since your last visit? Include any pap smears or colon screening. No     Patient presents today for post op visit Gastric Bypass 12/11/2017.

## 2018-03-29 NOTE — PROGRESS NOTES
Subjective:      Hansa Conley is a 44 y.o. female is now 3 months status post laparoscopic gastric bypass surgery. Doing well overall. Currently on a stage 4 diet without difficulty. Taking in 50+ oz water daily. Sources of protein include tuna, chicken and eggs . She is walking 3-5 nights a week for 30+ min. Bowel movements are alternating constipation and regularity. The patient is not having any pain. . The patient is compliant with multivitamins, calcium, Vit D and B12 supplements. Weight Loss Metrics 3/29/2018 12/28/2017 12/11/2017 12/7/2017 11/8/2017 11/8/2017 8/10/2017   Pre op / Initial Wt - - - - 381 - 391   Today's Wt 311 lb 9.6 oz 358 lb - 380 lb - 381 lb -   BMI 47.38 kg/m2 54.43 kg/m2 56.94 kg/m2 - - 56.26 kg/m2 -   Ideal Body Wt - - - - 146 - 146   Excess Body Wt - - - - 235 - 245   Goal Wt - - - - 193 - 195   Wt loss to date - - - - 0 - 0   % Wt Loss - - - - 0 - 0   80% EBW - - - - 188 - 196       Body mass index is 47.38 kg/(m^2). Comorbidities:    Hypertension: not applicable  Diabetes: not applicable  Obstructive Sleep Apnea: not applicable  Hyperlipidemia: not applicable  Stress Urinary Incontinence: not applicable  Gastroesophageal Reflux: resolved  Weight related arthropathy:not applicable        Past Medical History:   Diagnosis Date    Arthritis     Pseudotumor cerebri        Past Surgical History:   Procedure Laterality Date    HX APPENDECTOMY      HX GASTRIC BYPASS  12/11/2017    HX HEENT      T and A       Current Outpatient Prescriptions   Medication Sig Dispense Refill    cyanocobalamin (VITAMIN B-12) 1,000 mcg tablet Take 1,000 mcg by mouth daily.  acetaminophen (TYLENOL) 325 mg tablet Take  by mouth every four (4) hours as needed for Pain.  cholecalciferol, VITAMIN D3, (VITAMIN D3) 5,000 unit tab tablet Take  by mouth daily.  multivitamin (ONE A DAY) tablet Take 1 Tab by mouth daily.       calcium-cholecalciferol, d3, (CALCIUM 600 + D) 600-125 mg-unit tab Take  by mouth.  ferrous sulfate (IRON) 325 mg (65 mg iron) tablet Take  by mouth Daily (before breakfast). Allergies   Allergen Reactions    Tetracycline Other (comments)     Increases symptoms of pseudo tumor         Objective:     Visit Vitals    /83 (BP 1 Location: Right arm, BP Patient Position: Sitting)    Pulse 79    Temp 97.7 °F (36.5 °C) (Oral)    Resp 16    Ht 5' 8\" (1.727 m)    Wt 141.3 kg (311 lb 9.6 oz)    LMP 03/28/2018 (Exact Date)    SpO2 98%    BMI 47.38 kg/m2       General:  alert, cooperative, no distress, appears stated age   Chest: lungs clear to auscultation, no accessory muscle use   Cor:   Regular rate and rhythm   Abdomen: soft, bowel sounds active, non-tender   Incisions:   healing well, no drainage, no erythema, no hernia, no seroma, no swelling, no dehiscence, incision well approximated       Labs: reviewed, no gross abnormalities    Assessment:     Doing well postoperatively.     Plan:     Increase activity to the goal of 30 minutes daily, Follow up labs as ordered, Follow up with Registered Dietician and Continue MVI/Ca/B12 supplementation  Encouraged to attend support group  Follow up in 3 months

## 2018-03-29 NOTE — MR AVS SNAPSHOT
[facility] 
 
 
 [address] [facility] [address] [id number] 
[phone] Patient: Olivier Gary MRN: [id number] :1980 Visit Information Date & Time Provider Department Dept. Phone Encounter #  
 3/29/2018 10:15 AM Mansi Farley MD [place] [id number] Your Appointments 2018  2:00 PM  
Follow Up with Mansi Farley MD  
[address] [place] [address]) Appt Note: 3 month follow up  
 [address] [facility] [address] [facility]  
  
   
 [address] [facility] [address] [address] [facility] [address] Upcoming Health Maintenance Date Due DTaP/Tdap/Td series (1 - Tdap) 2000 PAP AKA CERVICAL CYTOLOGY 2000 Influenza Age 5 to Adult 2017 Allergies as of 3/29/2018  Review Complete On: 3/29/2018 By: Eden Galicia LPN Severity Noted Reaction Type Reactions Tetracycline  2017    Other (comments) Increases symptoms of pseudo tumor Current Immunizations  Never Reviewed No immunizations on file. Not reviewed this visit You Were Diagnosed With   
  
 Codes Comments Morbid obesity due to excess calories ([facility]    -  Primary ICD-10-CM: E66.01 
ICD-9-CM: 278.01 Postoperative malabsorption     ICD-10-CM: K91.2 ICD-9-CM: 579.3 Vitals BP Pulse Temp Resp Height(growth percentile) Weight(growth percentile) 117/83 (BP 1 Location: Right arm, BP Patient Position: Sitting) 79 97.7 °F (36.5 °C) (Oral) 16 5' 8\" (1.727 m) 311 lb 9.6 oz (141.3 kg) LMP SpO2 BMI OB Status Smoking Status 2018 (Exact Date) 98% 47.38 kg/m2 Having regular periods Former Smoker BMI and BSA Data Body Mass Index Body Surface Area  
 47.38 kg/m 2 2.6 m 2 Your Updated Medication List  
  
   
 This list is accurate as of 3/29/18 11:15 AM.  Always use your most recent med list.  
  
  
  
  
 CALCIUM 600 + D 600-125 mg-unit Tab Generic drug:  calcium-cholecalciferol (d3) Take  by mouth. Iron 325 mg (65 mg iron) tablet Generic drug:  ferrous sulfate Take  by mouth Daily (before breakfast). multivitamin tablet Commonly known as:  ONE A DAY Take 1 Tab by mouth daily. TYLENOL 325 mg tablet Generic drug:  acetaminophen Take  by mouth every four (4) hours as needed for Pain. VITAMIN B-12 1,000 mcg tablet Generic drug:  cyanocobalamin Take 1,000 mcg by mouth daily. VITAMIN D3 5,000 unit Tab tablet Generic drug:  cholecalciferol (VITAMIN D3) Take  by mouth daily. To-Do List   
 09/18/2018 3:15 PM  
  Appointment with [facility] BARIATRIC DIETITIAN at [facility] BARIATRIC  ([phone]) The appointment time noted includes the 15 minutes time frame for check in. Please do not arrive earlier than posted appointment time. Upon arrival report to the Conference Room located in [address] [place] [address], [address] [place] [address]. Patient Instructions If you have any questions or concerns about today's appointment, the verbal and/or written instructions you were given for follow up care, please call our office at [phone]. [facility] [place] [address] [place] [address], [address] [place] [id number] office [phone] Introducing [facility]! Dear Maurilio Ty: Thank you for requesting a [employer] account. Our records indicate that you already have an active [employer] account. You can access your account anytime at [email or web address] Did you know that you can access your hospital and ER discharge instructions at any time in [employer]? You can also review all of your test results from your hospital stay or ER visit. Additional Information If you have questions, please visit the Frequently Asked Questions section of the [email or web address] website at [email or web address] Remember, [employer] is NOT to be used for urgent needs. For medical emergencies, dial 911. Now available from your iPhone and Android! Please provide this summary of care documentation to your next provider. Your primary care clinician is listed as NONE. If you have any questions after today's visit, please call [phone].

## 2018-03-30 ENCOUNTER — IMPORTED ENCOUNTER (OUTPATIENT)
Dept: URBAN - METROPOLITAN AREA CLINIC 1 | Facility: CLINIC | Age: 38
End: 2018-03-30

## 2018-03-30 PROBLEM — H52.13: Noted: 2018-03-30

## 2018-03-30 PROCEDURE — S0620 ROUTINE OPHTHALMOLOGICAL EXA: HCPCS

## 2018-03-30 NOTE — PATIENT DISCUSSION
1. Myopia: Rx was given for correction if indicated and requested. 2. HX of pseudotumor cerebri 3. Return for an appointment in 1 week for Contact lens check. with Dr. Yuriy Faust. 4.  Return for an appointment in 1 year for 40 and Contact lens check. with Dr. Yuriy Faust.

## 2018-04-06 ENCOUNTER — IMPORTED ENCOUNTER (OUTPATIENT)
Dept: URBAN - METROPOLITAN AREA CLINIC 1 | Facility: CLINIC | Age: 38
End: 2018-04-06

## 2018-06-25 ENCOUNTER — TELEPHONE (OUTPATIENT)
Dept: SURGERY | Age: 38
End: 2018-06-25

## 2018-06-25 NOTE — TELEPHONE ENCOUNTER
Patient called to know if her labs were in the system. Advised patient there are future labs in system for her.

## 2018-06-29 ENCOUNTER — TELEPHONE (OUTPATIENT)
Dept: SURGERY | Age: 38
End: 2018-06-29

## 2018-06-29 DIAGNOSIS — E66.01 MORBID OBESITY DUE TO EXCESS CALORIES (HCC): Primary | ICD-10-CM

## 2018-07-03 ENCOUNTER — HOSPITAL ENCOUNTER (OUTPATIENT)
Dept: LAB | Age: 38
Discharge: HOME | End: 2018-07-03
Payer: COMMERCIAL

## 2018-07-03 LAB
25(OH)D3 SERPL-MCNC: 33.9 NG/ML (ref 30–100)
ALBUMIN SERPL-MCNC: 3.8 G/DL (ref 3.4–5)
ALBUMIN/GLOB SERPL: 1.3 {RATIO} (ref 0.8–1.7)
ALP SERPL-CCNC: 116 U/L (ref 45–117)
ALT SERPL-CCNC: 21 U/L (ref 13–56)
ANION GAP SERPL CALC-SCNC: 7 MMOL/L (ref 3–18)
AST SERPL-CCNC: 17 U/L (ref 15–37)
BASOPHILS # BLD: 0 K/UL (ref 0–0.06)
BASOPHILS NFR BLD: 0 % (ref 0–2)
BILIRUB SERPL-MCNC: 0.5 MG/DL (ref 0.2–1)
BUN SERPL-MCNC: 17 MG/DL (ref 7–18)
BUN/CREAT SERPL: 30 (ref 12–20)
CALCIUM SERPL-MCNC: 8.8 MG/DL (ref 8.5–10.1)
CHLORIDE SERPL-SCNC: 106 MMOL/L (ref 100–108)
CHOLEST SERPL-MCNC: 100 MG/DL
CO2 SERPL-SCNC: 27 MMOL/L (ref 21–32)
CREAT SERPL-MCNC: 0.57 MG/DL (ref 0.6–1.3)
DIFFERENTIAL METHOD BLD: ABNORMAL
EOSINOPHIL # BLD: 0.2 K/UL (ref 0–0.4)
EOSINOPHIL NFR BLD: 2 % (ref 0–5)
ERYTHROCYTE [DISTWIDTH] IN BLOOD BY AUTOMATED COUNT: 13.2 % (ref 11.6–14.5)
FERRITIN SERPL-MCNC: 90 NG/ML (ref 8–388)
FOLATE SERPL-MCNC: 5.8 NG/ML (ref 3.1–17.5)
GLOBULIN SER CALC-MCNC: 3 G/DL (ref 2–4)
GLUCOSE SERPL-MCNC: 80 MG/DL (ref 74–99)
HCT VFR BLD AUTO: 42.2 % (ref 35–45)
HDLC SERPL-MCNC: 36 MG/DL (ref 40–60)
HDLC SERPL: 2.8 {RATIO} (ref 0–5)
HGB BLD-MCNC: 14.2 G/DL (ref 12–16)
IRON SERPL-MCNC: 70 UG/DL (ref 50–175)
LDLC SERPL CALC-MCNC: 43.6 MG/DL (ref 0–100)
LIPID PROFILE,FLP: ABNORMAL
LYMPHOCYTES # BLD: 1.3 K/UL (ref 0.9–3.6)
LYMPHOCYTES NFR BLD: 19 % (ref 21–52)
MCH RBC QN AUTO: 30.9 PG (ref 24–34)
MCHC RBC AUTO-ENTMCNC: 33.6 G/DL (ref 31–37)
MCV RBC AUTO: 91.7 FL (ref 74–97)
MONOCYTES # BLD: 0.3 K/UL (ref 0.05–1.2)
MONOCYTES NFR BLD: 5 % (ref 3–10)
NEUTS SEG # BLD: 5 K/UL (ref 1.8–8)
NEUTS SEG NFR BLD: 74 % (ref 40–73)
PLATELET # BLD AUTO: 208 K/UL (ref 135–420)
PMV BLD AUTO: 12.8 FL (ref 9.2–11.8)
POTASSIUM SERPL-SCNC: 4 MMOL/L (ref 3.5–5.5)
PROT SERPL-MCNC: 6.8 G/DL (ref 6.4–8.2)
RBC # BLD AUTO: 4.6 M/UL (ref 4.2–5.3)
SODIUM SERPL-SCNC: 140 MMOL/L (ref 136–145)
TRIGL SERPL-MCNC: 102 MG/DL (ref ?–150)
VIT B12 SERPL-MCNC: 983 PG/ML (ref 211–911)
VLDLC SERPL CALC-MCNC: 20.4 MG/DL
WBC # BLD AUTO: 6.7 K/UL (ref 4.6–13.2)

## 2018-07-03 PROCEDURE — 80061 LIPID PANEL: CPT | Performed by: SURGERY

## 2018-07-03 PROCEDURE — 85025 COMPLETE CBC W/AUTO DIFF WBC: CPT | Performed by: SURGERY

## 2018-07-03 PROCEDURE — 80053 COMPREHEN METABOLIC PANEL: CPT | Performed by: SURGERY

## 2018-07-03 PROCEDURE — 82607 VITAMIN B-12: CPT | Performed by: SURGERY

## 2018-07-03 PROCEDURE — 82728 ASSAY OF FERRITIN: CPT | Performed by: SURGERY

## 2018-07-03 PROCEDURE — 83540 ASSAY OF IRON: CPT | Performed by: SURGERY

## 2018-07-03 PROCEDURE — 82306 VITAMIN D 25 HYDROXY: CPT | Performed by: SURGERY

## 2018-07-03 PROCEDURE — 84425 ASSAY OF VITAMIN B-1: CPT | Performed by: SURGERY

## 2018-07-03 PROCEDURE — 36415 COLL VENOUS BLD VENIPUNCTURE: CPT | Performed by: SURGERY

## 2018-07-06 ENCOUNTER — OFFICE VISIT (OUTPATIENT)
Dept: SURGERY | Age: 38
End: 2018-07-06

## 2018-07-06 VITALS
BODY MASS INDEX: 41.04 KG/M2 | HEIGHT: 68 IN | RESPIRATION RATE: 18 BRPM | OXYGEN SATURATION: 100 % | WEIGHT: 270.8 LBS | TEMPERATURE: 97.6 F

## 2018-07-06 DIAGNOSIS — K91.2 POSTOPERATIVE MALABSORPTION: Primary | ICD-10-CM

## 2018-07-06 LAB — VIT B1 BLD-SCNC: 111.7 NMOL/L (ref 66.5–200)

## 2018-07-06 NOTE — PATIENT INSTRUCTIONS
If you have any questions or concerns about today's appointment, the verbal and/or written instructions you were given for follow up care, please call our office at [phone].     [facility] - [address]    [phone] office  [phone]

## 2018-07-06 NOTE — PROGRESS NOTES
Chief Complaint   Patient presents with    Follow-up     6 month follow up gastric bypass 12.11.17. No complaints     Body mass index is 41.17 kg/(m^2). 1. Have you been to the ER, urgent care clinic since your last visit? Hospitalized since your last visit? No    2. Have you seen or consulted any other health care providers outside of the [facility] since your last visit? Include any pap smears or colon screening.  No

## 2018-07-06 NOTE — PROGRESS NOTES
Subjective:      Tacey Boas is a 44 y.o. female is now 7 months status post laparoscopic gastric bypass surgery. Doing well overall. Currently on a stage 4 diet without difficulty. Taking in 60-65oz water daily. Sources of protein include chicken, supplements, tuna and chickpeas, cheese. Exercise is walking/running 4-5times a week. She is training for 5K. Bowel movements are regular. The patient is compliant with multivitamins, calcium, Vit D and B12 supplements. Weight Loss Metrics 7/6/2018 3/29/2018 12/28/2017 12/11/2017 12/7/2017 11/8/2017 11/8/2017   Pre op / Initial Wt 381 - - - - 381 -   Today's Wt 270 lb 12.8 oz 311 lb 9.6 oz 358 lb - 380 lb - 381 lb   BMI 41.17 kg/m2 47.38 kg/m2 54.43 kg/m2 56.94 kg/m2 - - 56.26 kg/m2   Ideal Body Wt 146 - - - - 146 -   Excess Body Wt 235 - - - - 235 -   Goal Wt - - - - - 193 -   Wt loss to date 110.2 - - - - 0 -   % Wt Loss 0.59 - - - - 0 -   80%  - - - - 188 -       Body mass index is 41.17 kg/(m^2). Comorbidities:    Hypertension: not applicable  Diabetes: not applicable  Obstructive Sleep Apnea: not applicable  Hyperlipidemia: not applicable  Stress Urinary Incontinence: not applicable  Gastroesophageal Reflux: resolved  Weight related arthropathy:n/a        Past Medical History:   Diagnosis Date    Arthritis     Pseudotumor cerebri        Past Surgical History:   Procedure Laterality Date    HX APPENDECTOMY      HX GASTRIC BYPASS  12/11/2017    HX HEENT      T and A       Current Outpatient Prescriptions   Medication Sig Dispense Refill    pedi multivit 43-iron fumarate (FLINTSTONES COMPLETE, IRON,) 18 mg iron chew Take  by mouth.  cyanocobalamin (VITAMIN B-12) 1,000 mcg tablet Take 1,000 mcg by mouth daily.  acetaminophen (TYLENOL) 325 mg tablet Take  by mouth every four (4) hours as needed for Pain.  cholecalciferol, VITAMIN D3, (VITAMIN D3) 5,000 unit tab tablet Take  by mouth daily.       calcium-cholecalciferol, d3, (CALCIUM 600 + D) 600-125 mg-unit tab Take  by mouth.  ferrous sulfate (IRON) 325 mg (65 mg iron) tablet Take  by mouth Daily (before breakfast).  multivitamin (ONE A DAY) tablet Take 1 Tab by mouth daily. Allergies   Allergen Reactions    Tetracycline Other (comments)     Increases symptoms of pseudo tumor         Objective:     Visit Vitals    Temp 97.6 °F (36.4 °C) (Oral)    Resp 18    Ht 5' 8\" (1.727 m)    Wt 122.8 kg (270 lb 12.8 oz)    LMP 06/25/2018    SpO2 100%    BMI 41.17 kg/m2       General:  alert, cooperative, no distress, appears stated age   Chest: lungs clear to auscultation, breath sounds equal and symmetric, no accessory muscle use   Cor:   Regular rate and rhythm   Abdomen: soft, bowel sounds active, non-tender   Incisions:   healing well, no drainage, no erythema, no hernia, no seroma, no swelling, no dehiscence, incision well approximated       Labs: reviewed, grossly normal    Assessment:     Doing well postoperatively.     Plan:     Increase activity to the goal of 30 minutes daily, Follow up labs as ordered, Increase fluids, Follow up with Registered Dietician and Continue MVI/Ca/B12 supplementation  Encouraged to attend support group  Follow up in 6 months

## 2018-10-16 ENCOUNTER — HOSPITAL ENCOUNTER (OUTPATIENT)
Dept: BARIATRICS/WEIGHT MGMT | Age: 38
Discharge: HOME | End: 2018-10-16

## 2018-10-17 ENCOUNTER — DOCUMENTATION ONLY (OUTPATIENT)
Dept: BARIATRICS/WEIGHT MGMT | Age: 38
End: 2018-10-17

## 2018-11-01 ENCOUNTER — TELEPHONE (OUTPATIENT)
Dept: SURGERY | Age: 38
End: 2018-11-01

## 2018-11-01 DIAGNOSIS — K90.89 OTHER SPECIFIED INTESTINAL MALABSORPTION: Primary | ICD-10-CM

## 2018-12-20 ENCOUNTER — HOSPITAL ENCOUNTER (OUTPATIENT)
Dept: LAB | Age: 38
Discharge: HOME | End: 2018-12-20
Payer: COMMERCIAL

## 2018-12-20 DIAGNOSIS — K90.89 OTHER SPECIFIED INTESTINAL MALABSORPTION: ICD-10-CM

## 2018-12-20 LAB
ALBUMIN SERPL-MCNC: 3.7 G/DL (ref 3.4–5)
ALBUMIN/GLOB SERPL: 1.3 {RATIO} (ref 0.8–1.7)
ALP SERPL-CCNC: 88 U/L (ref 45–117)
ALT SERPL-CCNC: 20 U/L (ref 13–56)
ANION GAP SERPL CALC-SCNC: 5 MMOL/L (ref 3–18)
AST SERPL-CCNC: 13 U/L (ref 15–37)
BASOPHILS # BLD: 0 K/UL (ref 0–0.1)
BASOPHILS NFR BLD: 0 % (ref 0–2)
BILIRUB SERPL-MCNC: 0.6 MG/DL (ref 0.2–1)
BUN SERPL-MCNC: 15 MG/DL (ref 7–18)
BUN/CREAT SERPL: 23 (ref 12–20)
CALCIUM SERPL-MCNC: 8.7 MG/DL (ref 8.5–10.1)
CHLORIDE SERPL-SCNC: 108 MMOL/L (ref 100–108)
CO2 SERPL-SCNC: 28 MMOL/L (ref 21–32)
CREAT SERPL-MCNC: 0.65 MG/DL (ref 0.6–1.3)
DIFFERENTIAL METHOD BLD: NORMAL
EOSINOPHIL # BLD: 0.1 K/UL (ref 0–0.4)
EOSINOPHIL NFR BLD: 2 % (ref 0–5)
ERYTHROCYTE [DISTWIDTH] IN BLOOD BY AUTOMATED COUNT: 13.4 % (ref 11.6–14.5)
FERRITIN SERPL-MCNC: 37 NG/ML (ref 8–388)
FOLATE SERPL-MCNC: 12.6 NG/ML (ref 3.1–17.5)
GLOBULIN SER CALC-MCNC: 2.8 G/DL (ref 2–4)
GLUCOSE SERPL-MCNC: 84 MG/DL (ref 74–99)
HCT VFR BLD AUTO: 41.5 % (ref 35–45)
HGB BLD-MCNC: 13.7 G/DL (ref 12–16)
IRON SERPL-MCNC: 80 UG/DL (ref 50–175)
LYMPHOCYTES # BLD: 1.3 K/UL (ref 0.9–3.6)
LYMPHOCYTES NFR BLD: 26 % (ref 21–52)
MCH RBC QN AUTO: 30.6 PG (ref 24–34)
MCHC RBC AUTO-ENTMCNC: 33 G/DL (ref 31–37)
MCV RBC AUTO: 92.8 FL (ref 74–97)
MONOCYTES # BLD: 0.3 K/UL (ref 0.05–1.2)
MONOCYTES NFR BLD: 5 % (ref 3–10)
NEUTS SEG # BLD: 3.4 K/UL (ref 1.8–8)
NEUTS SEG NFR BLD: 67 % (ref 40–73)
PLATELET # BLD AUTO: 217 K/UL (ref 135–420)
PMV BLD AUTO: 11.1 FL (ref 9.2–11.8)
POTASSIUM SERPL-SCNC: 4 MMOL/L (ref 3.5–5.5)
PROT SERPL-MCNC: 6.5 G/DL (ref 6.4–8.2)
RBC # BLD AUTO: 4.47 M/UL (ref 4.2–5.3)
SODIUM SERPL-SCNC: 141 MMOL/L (ref 136–145)
VIT B12 SERPL-MCNC: 965 PG/ML (ref 211–911)
WBC # BLD AUTO: 5.1 K/UL (ref 4.6–13.2)

## 2018-12-20 PROCEDURE — 82728 ASSAY OF FERRITIN: CPT

## 2018-12-20 PROCEDURE — 82607 VITAMIN B-12: CPT

## 2018-12-20 PROCEDURE — 85025 COMPLETE CBC W/AUTO DIFF WBC: CPT

## 2018-12-20 PROCEDURE — 36415 COLL VENOUS BLD VENIPUNCTURE: CPT

## 2018-12-20 PROCEDURE — 83540 ASSAY OF IRON: CPT

## 2018-12-20 PROCEDURE — 80053 COMPREHEN METABOLIC PANEL: CPT

## 2018-12-20 PROCEDURE — 84425 ASSAY OF VITAMIN B-1: CPT

## 2018-12-20 PROCEDURE — 82306 VITAMIN D 25 HYDROXY: CPT

## 2018-12-23 LAB
25(OH)D2 SERPL-MCNC: <1 NG/ML
25(OH)D3 SERPL-MCNC: 25 NG/ML
25(OH)D3+25(OH)D2 SERPL-MCNC: 26 NG/ML

## 2018-12-24 ENCOUNTER — OFFICE VISIT (OUTPATIENT)
Dept: SURGERY | Age: 38
End: 2018-12-24

## 2018-12-24 VITALS
HEART RATE: 65 BPM | BODY MASS INDEX: 35.4 KG/M2 | WEIGHT: 239 LBS | DIASTOLIC BLOOD PRESSURE: 70 MMHG | HEIGHT: 69 IN | SYSTOLIC BLOOD PRESSURE: 125 MMHG | TEMPERATURE: 95.6 F

## 2018-12-24 DIAGNOSIS — R79.89 LOW VITAMIN D LEVEL: ICD-10-CM

## 2018-12-24 DIAGNOSIS — K91.2 POSTSURGICAL MALABSORPTION, NOT ELSEWHERE CLASSIFIED: ICD-10-CM

## 2018-12-24 DIAGNOSIS — R63.4 RAPID WEIGHT LOSS: ICD-10-CM

## 2018-12-24 DIAGNOSIS — E66.9 OBESITY (BMI 30-39.9): Primary | ICD-10-CM

## 2018-12-24 RX ORDER — MAGNESIUM 200 MG
1000 TABLET ORAL DAILY
COMMUNITY

## 2018-12-24 NOTE — PATIENT INSTRUCTIONS
If you have any questions or concerns about today's appointment, the verbal and/or written instructions you were given for follow up care, please call our office at [phone].     [facility] [place]  [address] [facility], [facility] [place] [address], [address] [place] [address]    [phone] office  [phone]

## 2018-12-24 NOTE — PROGRESS NOTES
Chief Complaint   Patient presents with    Follow-up     1 year GB       Patient reports that she is doing and feeling well. She reports 3 meals a day, one being a shake or a bar. She reports protein and veggie meals. 1. Have you been to the ER, urgent care clinic since your last visit? Hospitalized since your last visit? No    2. Have you seen or consulted any other health care providers outside of the [address] [place] [address] since your last visit? Include any pap smears or colon screening.  No

## 2018-12-24 NOTE — PROGRESS NOTES
Denise Granados Age is a 43 y.o. female is now 1 years status post laparoscopic gastric bypass surgery. Doing well overall. Currently on a stage 4 diet without difficulty. Taking in 64-80oz water,  60g protein. 60 min of activity 5 days a week. Bowel movements are regular. The patient is not having any pain. . She is compliant with multivitamins, calcium, Vit D and B12 supplements. Has recently been taking gummy multivits. Inquiring today about best multi to use. Weight Loss Metrics 7/6/2018 3/29/2018 12/28/2017 12/11/2017 12/7/2017 11/8/2017 11/8/2017   Pre op / Initial Wt 381 - - - - 381 -   Today's Wt 270 lb 12.8 oz 311 lb 9.6 oz 358 lb - 380 lb - 381 lb   BMI 41.17 kg/m2 47.38 kg/m2 54.43 kg/m2 56.94 kg/m2 - - 56.26 kg/m2   Ideal Body Wt 146 - - - - 146 -   Excess Body Wt 235 - - - - 235 -   Goal Wt - - - - - 193 -   Wt loss to date 110.2 - - - - 0 -   % Wt Loss 0.59 - - - - 0 -   80%  - - - - 188 -       There is no height or weight on file to calculate BMI. Comorbidities:  Hypertension: not applicable  Diabetes: not applicable  Obstructive Sleep Apnea: not applicable  Hyperlipidemia: not applicable  Stress Urinary Incontinence: not applicable  Gastroesophageal Reflux: resolved  Weight related arthropathy:n/a        Past Medical History:   Diagnosis Date    Arthritis     Pseudotumor cerebri        Past Surgical History:   Procedure Laterality Date    HX APPENDECTOMY      HX GASTRIC BYPASS  12/11/2017    HX HEENT      T and A       Current Outpatient Medications   Medication Sig Dispense Refill    cyanocobalamin (VITAMIN B-12) 1,000 mcg sublingual tablet Take 1,000 mcg by mouth daily.  acetaminophen (TYLENOL) 325 mg tablet Take  by mouth every four (4) hours as needed for Pain.  cholecalciferol, VITAMIN D3, (VITAMIN D3) 5,000 unit tab tablet Take  by mouth daily.  multivitamin (ONE A DAY) tablet Take 1 Tab by mouth daily.       calcium-cholecalciferol, d3, (CALCIUM 600 + D) 600-125 mg-unit tab Take  by mouth.  pedi multivit 43-iron fumarate (FLINTSTONES COMPLETE, IRON,) 18 mg iron chew Take  by mouth.  cyanocobalamin (VITAMIN B-12) 1,000 mcg tablet Take 1,000 mcg by mouth daily.  ferrous sulfate (IRON) 325 mg (65 mg iron) tablet Take  by mouth Daily (before breakfast). Allergies   Allergen Reactions    Tetracycline Other (comments)     Increases symptoms of pseudo tumor       ROS:  Review of Systems   All other systems reviewed and are negative. Physicial Exam:  There were no vitals taken for this visit. Physical Exam   Constitutional: She is oriented to person, place, and time and well-developed, well-nourished, and in no distress. HENT:   Head: Normocephalic. Cardiovascular: Normal rate. Pulmonary/Chest: Effort normal.   Abdominal: Soft. She exhibits no distension. There is no tenderness. Musculoskeletal: Normal range of motion. Neurological: She is alert and oriented to person, place, and time. Skin: Skin is warm and dry. Psychiatric: Affect normal.   Nursing note and vitals reviewed. Labs:    Ref. Range 12/20/2018 08:44   WBC Latest Ref Range: 4.6 - 13.2 K/uL 5.1   RBC Latest Ref Range: 4.20 - 5.30 M/uL 4.47   HGB Latest Ref Range: 12.0 - 16.0 g/dL 13.7   HCT Latest Ref Range: 35.0 - 45.0 % 41.5   MCV Latest Ref Range: 74.0 - 97.0 FL 92.8   MCH Latest Ref Range: 24.0 - 34.0 PG 30.6   MCHC Latest Ref Range: 31.0 - 37.0 g/dL 33.0   RDW Latest Ref Range: 11.6 - 14.5 % 13.4   PLATELET Latest Ref Range: 135 - 420 K/uL 217   MPV Latest Ref Range: 9.2 - 11.8 FL 11.1   NEUTROPHILS Latest Ref Range: 40 - 73 % 67   LYMPHOCYTES Latest Ref Range: 21 - 52 % 26   MONOCYTES Latest Ref Range: 3 - 10 % 5   EOSINOPHILS Latest Ref Range: 0 - 5 % 2   BASOPHILS Latest Ref Range: 0 - 2 % 0   DF Latest Units:   AUTOMATED   ABS. NEUTROPHILS Latest Ref Range: 1.8 - 8.0 K/UL 3.4   ABS. LYMPHOCYTES Latest Ref Range: 0.9 - 3.6 K/UL 1.3   ABS.  MONOCYTES Latest Ref Range: 0.05 - 1.2 K/UL 0.3   ABS. EOSINOPHILS Latest Ref Range: 0.0 - 0.4 K/UL 0.1   ABS. BASOPHILS Latest Ref Range: 0.0 - 0.1 K/UL 0.0   Sodium Latest Ref Range: 136 - 145 mmol/L 141   Potassium Latest Ref Range: 3.5 - 5.5 mmol/L 4.0   Chloride Latest Ref Range: 100 - 108 mmol/L 108   CO2 Latest Ref Range: 21 - 32 mmol/L 28   Anion gap Latest Ref Range: 3.0 - 18 mmol/L 5   Glucose Latest Ref Range: 74 - 99 mg/dL 84   BUN Latest Ref Range: 7.0 - 18 MG/DL 15   Creatinine Latest Ref Range: 0.6 - 1.3 MG/DL 0.65   BUN/Creatinine ratio Latest Ref Range: 12 - 20   23 (H)   Calcium Latest Ref Range: 8.5 - 10.1 MG/DL 8.7   GFR est non-AA Latest Ref Range: >60 ml/min/1.73m2 >60   GFR est AA Latest Ref Range: >60 ml/min/1.73m2 >60   Bilirubin, total Latest Ref Range: 0.2 - 1.0 MG/DL 0.6   Protein, total Latest Ref Range: 6.4 - 8.2 g/dL 6.5   Albumin Latest Ref Range: 3.4 - 5.0 g/dL 3.7   Globulin Latest Ref Range: 2.0 - 4.0 g/dL 2.8   A-G Ratio Latest Ref Range: 0.8 - 1.7   1.3   ALT (SGPT) Latest Ref Range: 13 - 56 U/L 20   AST Latest Ref Range: 15 - 37 U/L 13 (L)   Alk. phosphatase Latest Ref Range: 45 - 117 U/L 88   Vitamin B12 Latest Ref Range: 211 - 911 pg/mL 965 (H)   Folate Latest Ref Range: 3.10 - 17.50 ng/mL 12.6   Iron Latest Ref Range: 50 - 175 ug/dL 80   Ferritin Latest Ref Range: 8 - 388 NG/ML 37   VITAMIN B1, WHOLE BLOOD Unknown pending   VITAMIN D, 25 HYROXY PANEL Unknown 26       Assessment/Plan: Pt is currently 1 year s/p laparoscopic gastric bypass surgery with a total weight loss of 142lbs to date, doing well. Labs were reviwed and pt was instructed to continue with B12/Gareth/D and to transition to ENMANUEL RIVERA [facility] multi. Stressed importance of hydration with SF clear liquids until urine clear. Continue with food content mainly meats/veggies. Encouraged support group attendance. Advised exercise program of 150mins/ week.       >50% of 30 min visit spent counseling   PAGE Black-BC

## 2018-12-27 LAB — VIT B1 BLD-SCNC: 151 NMOL/L (ref 66.5–200)

## 2018-12-27 NOTE — PROGRESS NOTES
12/27/18:  Patient was contacted and discussed her vitamin D3 level. Patient is currently taking 5000 IU of Vitamin D 3 and was instructed to increase to BID.     Joelle Mock MS RD

## 2019-05-13 ENCOUNTER — IMPORTED ENCOUNTER (OUTPATIENT)
Dept: URBAN - METROPOLITAN AREA CLINIC 1 | Facility: CLINIC | Age: 39
End: 2019-05-13

## 2019-05-13 PROBLEM — H52.13: Noted: 2019-05-13

## 2019-05-13 PROCEDURE — S0621 ROUTINE OPHTHALMOLOGICAL EXA: HCPCS

## 2019-05-13 NOTE — PATIENT DISCUSSION
1. Myopia: Rx was given for correction if indicated and requested. 2. Allergic Conjunctivitis OU - OTC Zaditor BID OU PRN itching 3. H/o Pseudotumor Cerebri Changes made and finalized CTL Luis Felipe Mcconnell for an appointment in 1 year 40/cc with Dr. Joni Schaumann.

## 2019-07-03 ENCOUNTER — TELEPHONE (OUTPATIENT)
Dept: SURGERY | Age: 39
End: 2019-07-03

## 2019-11-12 ENCOUNTER — DOCUMENTATION ONLY (OUTPATIENT)
Dept: SURGERY | Age: 39
End: 2019-11-12

## 2019-11-12 NOTE — PROGRESS NOTES
Per [facility] requirements;  E-mail and letter sent for follow up appointment. [facility] [place]   [facility]  [facility]        Dear Patient,        Your health is our main concern. It is important for your health to have follow-up lab work and to see you surgeon at 3 months, 6 months and annually after your weight loss surgery. Additionally, the Department of Bariatric Surgery at our hospital is a member of the [employer] [address] Surgical Quality Improvement Program ([facility] NSQIP). As a participant in this program, we gather information on the outcomes of our patients after surgery. Please call the office for a follow up appointment at [phone]. If you have moved out of the area or have changed surgeons please call us and let us know the name of your doctor. Your health and feedback are important to us. We greatly appreciate your response.        Thank you,  [facility] [facility]

## 2019-12-09 ENCOUNTER — TELEPHONE (OUTPATIENT)
Dept: SURGERY | Age: 39
End: 2019-12-09

## 2019-12-09 DIAGNOSIS — R63.4 RAPID WEIGHT LOSS: ICD-10-CM

## 2019-12-09 DIAGNOSIS — R79.89 LOW VITAMIN D LEVEL: ICD-10-CM

## 2019-12-09 DIAGNOSIS — E66.01 MORBID OBESITY DUE TO EXCESS CALORIES (HCC): Primary | ICD-10-CM

## 2019-12-09 DIAGNOSIS — K91.2 POSTOPERATIVE MALABSORPTION: ICD-10-CM

## 2019-12-09 NOTE — TELEPHONE ENCOUNTER
Patient requesting lab order to be placed. [facility] has 2 year LGBP F/U on 01/13/19 with NP Margot Salgado. Advised Patient labs will be placed. Patient verbalized understanding. Encounter will be closed.

## 2020-01-06 ENCOUNTER — HOSPITAL ENCOUNTER (OUTPATIENT)
Dept: LAB | Age: 40
Discharge: HOME | End: 2020-01-06
Payer: COMMERCIAL

## 2020-01-06 LAB
25(OH)D3 SERPL-MCNC: 40.2 NG/ML (ref 30–100)
ALBUMIN SERPL-MCNC: 4 G/DL (ref 3.4–5)
ANION GAP SERPL CALC-SCNC: 5 MMOL/L (ref 3–18)
BASOPHILS # BLD: 0 K/UL (ref 0–0.1)
BASOPHILS NFR BLD: 0 % (ref 0–2)
BUN SERPL-MCNC: 17 MG/DL (ref 7–18)
BUN/CREAT SERPL: 24 (ref 12–20)
CALCIUM SERPL-MCNC: 8.8 MG/DL (ref 8.5–10.1)
CHLORIDE SERPL-SCNC: 105 MMOL/L (ref 100–111)
CHOLEST SERPL-MCNC: 144 MG/DL
CO2 SERPL-SCNC: 29 MMOL/L (ref 21–32)
CREAT SERPL-MCNC: 0.7 MG/DL (ref 0.6–1.3)
DIFFERENTIAL METHOD BLD: NORMAL
EOSINOPHIL # BLD: 0.4 K/UL (ref 0–0.4)
EOSINOPHIL NFR BLD: 5 % (ref 0–5)
ERYTHROCYTE [DISTWIDTH] IN BLOOD BY AUTOMATED COUNT: 12.7 % (ref 11.6–14.5)
EST. AVERAGE GLUCOSE BLD GHB EST-MCNC: NORMAL MG/DL
FERRITIN SERPL-MCNC: 32 NG/ML (ref 8–388)
FOLATE SERPL-MCNC: 10.3 NG/ML (ref 3.1–17.5)
GLUCOSE SERPL-MCNC: 83 MG/DL (ref 74–99)
HBA1C MFR BLD: 4.9 % (ref 4.2–5.6)
HCT VFR BLD AUTO: 40.9 % (ref 35–45)
HDLC SERPL-MCNC: 56 MG/DL (ref 40–60)
HDLC SERPL: 2.6 {RATIO} (ref 0–5)
HGB BLD-MCNC: 13.5 G/DL (ref 12–16)
IRON SERPL-MCNC: 77 UG/DL (ref 50–175)
LDLC SERPL CALC-MCNC: 73 MG/DL (ref 0–100)
LIPID PROFILE,FLP: NORMAL
LYMPHOCYTES # BLD: 2.3 K/UL (ref 0.9–3.6)
LYMPHOCYTES NFR BLD: 28 % (ref 21–52)
MCH RBC QN AUTO: 31.8 PG (ref 24–34)
MCHC RBC AUTO-ENTMCNC: 33 G/DL (ref 31–37)
MCV RBC AUTO: 96.5 FL (ref 74–97)
MONOCYTES # BLD: 0.4 K/UL (ref 0.05–1.2)
MONOCYTES NFR BLD: 5 % (ref 3–10)
NEUTS SEG # BLD: 5.2 K/UL (ref 1.8–8)
NEUTS SEG NFR BLD: 62 % (ref 40–73)
PLATELET # BLD AUTO: 241 K/UL (ref 135–420)
PMV BLD AUTO: 10.6 FL (ref 9.2–11.8)
POTASSIUM SERPL-SCNC: 4.1 MMOL/L (ref 3.5–5.5)
RBC # BLD AUTO: 4.24 M/UL (ref 4.2–5.3)
SODIUM SERPL-SCNC: 139 MMOL/L (ref 136–145)
TRIGL SERPL-MCNC: 75 MG/DL (ref ?–150)
VIT B12 SERPL-MCNC: 1464 PG/ML (ref 211–911)
VLDLC SERPL CALC-MCNC: 15 MG/DL
WBC # BLD AUTO: 8.3 K/UL (ref 4.6–13.2)

## 2020-01-06 PROCEDURE — 82728 ASSAY OF FERRITIN: CPT

## 2020-01-06 PROCEDURE — 85025 COMPLETE CBC W/AUTO DIFF WBC: CPT

## 2020-01-06 PROCEDURE — 82040 ASSAY OF SERUM ALBUMIN: CPT

## 2020-01-06 PROCEDURE — 80048 BASIC METABOLIC PNL TOTAL CA: CPT

## 2020-01-06 PROCEDURE — 80061 LIPID PANEL: CPT

## 2020-01-06 PROCEDURE — 82306 VITAMIN D 25 HYDROXY: CPT

## 2020-01-06 PROCEDURE — 36415 COLL VENOUS BLD VENIPUNCTURE: CPT

## 2020-01-06 PROCEDURE — 82607 VITAMIN B-12: CPT

## 2020-01-06 PROCEDURE — 83540 ASSAY OF IRON: CPT

## 2020-01-06 PROCEDURE — 84425 ASSAY OF VITAMIN B-1: CPT

## 2020-01-06 PROCEDURE — 83036 HEMOGLOBIN GLYCOSYLATED A1C: CPT

## 2020-01-08 LAB — VIT B1 BLD-SCNC: 221.9 NMOL/L (ref 66.5–200)

## 2020-01-13 ENCOUNTER — OFFICE VISIT (OUTPATIENT)
Dept: SURGERY | Age: 40
End: 2020-01-13

## 2020-01-13 VITALS
SYSTOLIC BLOOD PRESSURE: 133 MMHG | HEART RATE: 72 BPM | RESPIRATION RATE: 20 BRPM | WEIGHT: 253 LBS | TEMPERATURE: 98.3 F | BODY MASS INDEX: 37.47 KG/M2 | DIASTOLIC BLOOD PRESSURE: 77 MMHG | HEIGHT: 69 IN

## 2020-01-13 DIAGNOSIS — K91.2 POSTOPERATIVE MALABSORPTION: ICD-10-CM

## 2020-01-13 DIAGNOSIS — F43.9 STRESS AT HOME: ICD-10-CM

## 2020-01-13 DIAGNOSIS — Z78.9 WEIGHT LOSS ADVISED: ICD-10-CM

## 2020-01-13 DIAGNOSIS — E66.9 OBESITY (BMI 30-39.9): Primary | ICD-10-CM

## 2020-01-13 RX ORDER — INDOMETHACIN 50 MG/1
CAPSULE ORAL
COMMUNITY
End: 2020-01-13 | Stop reason: ALTCHOICE

## 2020-01-13 RX ORDER — TRAMADOL HYDROCHLORIDE 50 MG/1
TABLET ORAL
Refills: 0 | COMMUNITY
Start: 2019-11-04

## 2020-01-13 RX ORDER — CEFDINIR 300 MG/1
CAPSULE ORAL
COMMUNITY
End: 2020-01-13 | Stop reason: ALTCHOICE

## 2020-01-13 RX ORDER — AMOXICILLIN AND CLAVULANATE POTASSIUM 875; 125 MG/1; MG/1
TABLET, FILM COATED ORAL
COMMUNITY
End: 2020-01-13 | Stop reason: ALTCHOICE

## 2020-01-13 RX ORDER — MEDROXYPROGESTERONE ACETATE 150 MG/ML
INJECTION, SUSPENSION INTRAMUSCULAR
COMMUNITY
End: 2020-01-13 | Stop reason: ALTCHOICE

## 2020-01-13 RX ORDER — CEPHALEXIN 500 MG/1
CAPSULE ORAL
COMMUNITY
End: 2020-01-13 | Stop reason: ALTCHOICE

## 2020-01-13 RX ORDER — PREDNISOLONE ACETATE 10 MG/ML
SUSPENSION/ DROPS OPHTHALMIC
COMMUNITY
End: 2020-01-13 | Stop reason: ALTCHOICE

## 2020-01-13 RX ORDER — NABUMETONE 500 MG/1
TABLET, FILM COATED ORAL
COMMUNITY
End: 2020-01-13 | Stop reason: ALTCHOICE

## 2020-01-13 RX ORDER — ACETAZOLAMIDE 500 MG/1
CAPSULE, EXTENDED RELEASE ORAL
COMMUNITY
End: 2020-01-13 | Stop reason: ALTCHOICE

## 2020-01-13 RX ORDER — METHOCARBAMOL 500 MG/1
TABLET, FILM COATED ORAL 4 TIMES DAILY
COMMUNITY

## 2020-01-13 RX ORDER — ACETAZOLAMIDE 250 MG/1
TABLET ORAL
COMMUNITY
End: 2020-01-13 | Stop reason: ALTCHOICE

## 2020-01-13 RX ORDER — MEDROXYPROGESTERONE ACETATE 150 MG/ML
150 INJECTION, SUSPENSION INTRAMUSCULAR
COMMUNITY
End: 2020-01-13 | Stop reason: ALTCHOICE

## 2020-01-13 RX ORDER — HYDROCORTISONE 1 %
CREAM (GRAM) TOPICAL
COMMUNITY
Start: 2018-09-07 | End: 2020-01-13 | Stop reason: ALTCHOICE

## 2020-01-13 NOTE — PROGRESS NOTES
Bariatric Postoperative Progress Note    Jose Epperson is a 35 y.o. female is now a little over 2 years status post laparoscopic gastric bypass surgery. Currently on a stage 4 diet without difficulty. Taking in 40-60oz water,  60g protein. Some min of activity several days a week. Bowel movements are regular. The patient is not having any pain. . She is compliant with multivitamins, calcium, Vit D and B12 supplements. ETOH 4-6   Struggling with some emotional stressors at home   Up 14lbs since last visit     Weight Loss Metrics 1/13/2020 1/13/2020 12/24/2018 12/24/2018 7/6/2018 3/29/2018 12/28/2017   Pre op / Initial Wt 391 - 391 - 381 - -   Today's Wt - 253 lb - 239 lb 270 lb 12.8 oz 311 lb 9.6 oz 358 lb   BMI - 37.36 kg/m2 - 35.29 kg/m2 41.17 kg/m2 47.38 kg/m2 54.43 kg/m2   Ideal Body Wt 143 - 143 - 146 - -   Excess Body Wt 248 - 248 - 235 - -   Goal Wt 193 - 193 - - - -   Wt loss to date 138 - 152 - 110.2 - -   % Wt Loss 0.7 - 0.77 - 0.59 - -   80% .4 - 198.4 - 188 - -       Comorbidities:  Hypertension: not applicable  Diabetes: not applicable  Obstructive Sleep Apnea: not applicable  Hyperlipidemia: not applicable  Stress Urinary Incontinence: not applicable  Gastroesophageal Reflux: resolved  Weight related arthropathy:n/a    Past Medical History:   Diagnosis Date    Arthritis     Pseudotumor cerebri        Past Surgical History:   Procedure Laterality Date    HX APPENDECTOMY      HX GASTRIC BYPASS  12/11/2017    HX HEENT      T and A       Current Outpatient Medications   Medication Sig Dispense Refill    traMADol (ULTRAM) 50 mg tablet TK 1 T PO  Q 6 H PRN P  0    methocarbamol (ROBAXIN) 500 mg tablet Take  by mouth four (4) times daily.  cyanocobalamin (VITAMIN B-12) 1,000 mcg sublingual tablet Take 1,000 mcg by mouth daily.  acetaminophen (TYLENOL) 325 mg tablet Take  by mouth every four (4) hours as needed for Pain.       cholecalciferol, VITAMIN D3, (VITAMIN D3) 5,000 unit tab tablet Take  by mouth daily.  multivitamin (ONE A DAY) tablet Take 1 Tab by mouth daily.  calcium-cholecalciferol, d3, (CALCIUM 600 + D) 600-125 mg-unit tab Take  by mouth. Allergies   Allergen Reactions    Tetracycline Other (comments)     Increases symptoms of pseudo tumor       ROS:  Review of Systems   Constitutional:        Weight gain    Psychiatric/Behavioral:        Stress   4-6 drinks per week    All other systems reviewed and are negative. Physicial Exam:  Visit Vitals  /77 (BP 1 Location: Left arm, BP Patient Position: At rest)   Pulse 72   Temp 98.3 °F (36.8 °C) (Oral)   Resp 20   Ht 5' 9\" (1.753 m)   Wt 114.8 kg (253 lb)   BMI 37.36 kg/m²     Physical Exam  Vitals signs and nursing note reviewed. HENT:      Head: Normocephalic. Cardiovascular:      Rate and Rhythm: Normal rate and regular rhythm. Pulmonary:      Effort: Pulmonary effort is normal.      Breath sounds: Normal breath sounds. Abdominal:      General: There is no distension. Palpations: Abdomen is soft. Tenderness: There is no tenderness. Musculoskeletal: Normal range of motion. Skin:     General: Skin is warm and dry. Neurological:      Mental Status: She is alert and oriented to person, place, and time.    Psychiatric:         Mood and Affect: Affect normal.         Labs:   Recent Results (from the past 672 hour(s))   ALBUMIN    Collection Time: 01/06/20  3:57 PM   Result Value Ref Range    Albumin 4.0 3.4 - 5.0 g/dL   CBC WITH AUTOMATED DIFF    Collection Time: 01/06/20  3:57 PM   Result Value Ref Range    WBC 8.3 4.6 - 13.2 K/uL    RBC 4.24 4.20 - 5.30 M/uL    HGB 13.5 12.0 - 16.0 g/dL    HCT 40.9 35.0 - 45.0 %    MCV 96.5 74.0 - 97.0 FL    MCH 31.8 24.0 - 34.0 PG    MCHC 33.0 31.0 - 37.0 g/dL    RDW 12.7 11.6 - 14.5 %    PLATELET 759 153 - 929 K/uL    MPV 10.6 9.2 - 11.8 FL    NEUTROPHILS 62 40 - 73 %    LYMPHOCYTES 28 21 - 52 %    MONOCYTES 5 3 - 10 %    EOSINOPHILS 5 0 - 5 %    BASOPHILS 0 0 - 2 %    ABS. NEUTROPHILS 5.2 1.8 - 8.0 K/UL    ABS. LYMPHOCYTES 2.3 0.9 - 3.6 K/UL    ABS. MONOCYTES 0.4 0.05 - 1.2 K/UL    ABS. EOSINOPHILS 0.4 0.0 - 0.4 K/UL    ABS. BASOPHILS 0.0 0.0 - 0.1 K/UL    DF AUTOMATED     METABOLIC PANEL, BASIC    Collection Time: 01/06/20  3:57 PM   Result Value Ref Range    Sodium 139 136 - 145 mmol/L    Potassium 4.1 3.5 - 5.5 mmol/L    Chloride 105 100 - 111 mmol/L    CO2 29 21 - 32 mmol/L    Anion gap 5 3.0 - 18 mmol/L    Glucose 83 74 - 99 mg/dL    BUN 17 7.0 - 18 MG/DL    Creatinine 0.70 0.6 - 1.3 MG/DL    BUN/Creatinine ratio 24 (H) 12 - 20      GFR est AA >60 >60 ml/min/1.73m2    GFR est non-AA >60 >60 ml/min/1.73m2    Calcium 8.8 8.5 - 10.1 MG/DL   VITAMIN B1, WHOLE BLOOD    Collection Time: 01/06/20  3:57 PM   Result Value Ref Range    Vitamin B1 221.9 (H) 66.5 - 200.0 nmol/L   HEMOGLOBIN A1C WITH EAG    Collection Time: 01/06/20  3:58 PM   Result Value Ref Range    Hemoglobin A1c 4.9 4.2 - 5.6 %    Est. average glucose Cannot be calculated mg/dL   VITAMIN B12 & FOLATE    Collection Time: 01/06/20  3:59 PM   Result Value Ref Range    Vitamin B12 1,464 (H) 211 - 911 pg/mL    Folate 10.3 3.10 - 17.50 ng/mL   FERRITIN    Collection Time: 01/06/20  3:59 PM   Result Value Ref Range    Ferritin 32 8 - 388 NG/ML   IRON    Collection Time: 01/06/20  3:59 PM   Result Value Ref Range    Iron 77 50 - 175 ug/dL   LIPID PANEL    Collection Time: 01/06/20  3:59 PM   Result Value Ref Range    LIPID PROFILE          Cholesterol, total 144 <200 MG/DL    Triglyceride 75 <150 MG/DL    HDL Cholesterol 56 40 - 60 MG/DL    LDL, calculated 73 0 - 100 MG/DL    VLDL, calculated 15 MG/DL    CHOL/HDL Ratio 2.6 0 - 5.0     VITAMIN D, 25 HYDROXY    Collection Time: 01/06/20  4:00 PM   Result Value Ref Range    Vitamin D 25-Hydroxy 40.2 30 - 100 ng/mL       Assessment/Plan:    TodayElizabeth is  Height: 5' 9\" (175.3 cm) tall, Weight: 114.8 kg (253 lb) lbs for a Body mass index is 37.36 kg/m². and are suffering from obesity . They are currently just over 2 years  s/p laparoscopic gastric bypass surgery with a total weight loss of 138 lbs to date, doing well but with some weight regain over the past year and struggling emotionally with stressors at home. Labs were reviewed and pt was instructed to continue MVI/Ca/B12/D3 supplementation. May take B1 anf B12 every other day. Stressed importance of hydration with SF clear liquids until urine clear. Continue with food content mainly protein veggies. Encouraged support group attendance. Advised exercise program of 150 mins per week. Refer to registered dietitian for more detailed medical nutrition therapy. Stop ETOH, risks discussed, denies using as coping mechanism. Counseling contact info distributed to pt. The primary encounter diagnosis was Obesity (BMI 30-39.9). Diagnoses of Postoperative malabsorption, Weight loss advised, and Stress at home were also pertinent to this visit. 3-6 mons for weight check, yearly with labs, sooner as needed.   Meet Angelo NP

## 2020-01-13 NOTE — PROGRESS NOTES
Tacey Boas is a 35 y.o. female who presents today with   Chief Complaint   Patient presents with    Morbid Obesity     Gastric Bypass 12/11/2017     Body mass index is 37.36 kg/m². 1. Have you been to the ER, urgent care clinic since your last visit? Hospitalized since your last visit? No    2. Have you seen or consulted any other health care providers outside of the [address] [place] [address] since your last visit? Include any pap smears or colon screening.  No

## 2020-01-13 NOTE — PATIENT INSTRUCTIONS
Recent Results (from the past 336 hour(s))   ALBUMIN    Collection Time: 01/06/20  3:57 PM   Result Value Ref Range    Albumin 4.0 3.4 - 5.0 g/dL   CBC WITH AUTOMATED DIFF    Collection Time: 01/06/20  3:57 PM   Result Value Ref Range    WBC 8.3 4.6 - 13.2 K/uL    RBC 4.24 4.20 - 5.30 M/uL    HGB 13.5 12.0 - 16.0 g/dL    HCT 40.9 35.0 - 45.0 %    MCV 96.5 74.0 - 97.0 FL    MCH 31.8 24.0 - 34.0 PG    MCHC 33.0 31.0 - 37.0 g/dL    RDW 12.7 11.6 - 14.5 %    PLATELET 920 545 - 582 K/uL    MPV 10.6 9.2 - 11.8 FL    NEUTROPHILS 62 40 - 73 %    LYMPHOCYTES 28 21 - 52 %    MONOCYTES 5 3 - 10 %    EOSINOPHILS 5 0 - 5 %    BASOPHILS 0 0 - 2 %    ABS. NEUTROPHILS 5.2 1.8 - 8.0 K/UL    ABS. LYMPHOCYTES 2.3 0.9 - 3.6 K/UL    ABS. MONOCYTES 0.4 0.05 - 1.2 K/UL    ABS. EOSINOPHILS 0.4 0.0 - 0.4 K/UL    ABS.  BASOPHILS 0.0 0.0 - 0.1 K/UL    DF AUTOMATED     METABOLIC PANEL, BASIC    Collection Time: 01/06/20  3:57 PM   Result Value Ref Range    Sodium 139 136 - 145 mmol/L    Potassium 4.1 3.5 - 5.5 mmol/L    Chloride 105 100 - 111 mmol/L    CO2 29 21 - 32 mmol/L    Anion gap 5 3.0 - 18 mmol/L    Glucose 83 74 - 99 mg/dL    BUN 17 7.0 - 18 MG/DL    Creatinine 0.70 0.6 - 1.3 MG/DL    BUN/Creatinine ratio 24 (H) 12 - 20      GFR est AA >60 >60 ml/min/1.73m2    GFR est non-AA >60 >60 ml/min/1.73m2    Calcium 8.8 8.5 - 10.1 MG/DL   VITAMIN B1, WHOLE BLOOD    Collection Time: 01/06/20  3:57 PM   Result Value Ref Range    Vitamin B1 221.9 (H) 66.5 - 200.0 nmol/L   HEMOGLOBIN A1C WITH EAG    Collection Time: 01/06/20  3:58 PM   Result Value Ref Range    Hemoglobin A1c 4.9 4.2 - 5.6 %    Est. average glucose Cannot be calculated mg/dL   VITAMIN B12 & FOLATE    Collection Time: 01/06/20  3:59 PM   Result Value Ref Range    Vitamin B12 1,464 (H) 211 - 911 pg/mL    Folate 10.3 3.10 - 17.50 ng/mL   FERRITIN    Collection Time: 01/06/20  3:59 PM   Result Value Ref Range    Ferritin 32 8 - 388 NG/ML   IRON    Collection Time: 01/06/20  3:59 PM Result Value Ref Range    Iron 77 50 - 175 ug/dL   LIPID PANEL    Collection Time: 01/06/20  3:59 PM   Result Value Ref Range    LIPID PROFILE          Cholesterol, total 144 <200 MG/DL    Triglyceride 75 <150 MG/DL    HDL Cholesterol 56 40 - 60 MG/DL    LDL, calculated 73 0 - 100 MG/DL    VLDL, calculated 15 MG/DL    CHOL/HDL Ratio 2.6 0 - 5.0     VITAMIN D, 25 HYDROXY    Collection Time: 01/06/20  4:00 PM   Result Value Ref Range    Vitamin D 25-Hydroxy 40.2 30 - 100 ng/mL     Supplement Resource Guide  Protein Supplement (Recommended up to 6 months post-op)   60-70 Grams of Protein  (during clear liquid phase)   30-50 Grams of Protein  (during soft protein phase and beyond)   0-3 g fat per serving/ 0-3 g sugar per serving   Avoid mixing with milk, fruit, peanut butter, etc.   (Powder should be made with water or Crystal Light)  Calcium Supplement (Lifetime)  Look for: Calcium Citrate (1500 mg per day)   The body cannot absorb more than 500-600 mg at once and needs to be taken in 3 separate dosages. Recommend:   Citracal- 630mg (2 caplets) three times each day   Upcal D- 3 packages or scoops/day. (Each package taken separately)  o www.colonialmedical.com (powdered calcium)   Liquid- 3 Tbsp. per day. (Each Tbsp. taken separately)   Chewable- Must be Calcium citrate  o www.[employer]  o www.Cerahelixebratevitamins. [employer]star  Vitamin D (Lifetime)                           Look for: Vitamin D3 (Cholecalciferol)   5,000 IU of Vitamin D3 per day   This is in ADDITION to the Vitamin D in your Calcium and Multivitamin    Multi-Vitamin Supplement (Lifetime)  Take 2 vitamins separately each day   Flintstones Complete or a generic chewable complete multivitamin   Bariatric Advantage Multivitamin   Vitamin B1   (Lifetime)   100 mg B1 needed per day (if taking Flintstones Complete or a generic complete chewable).    Additional B1 is NOT needed if taking Bariatric Advantage Multivitamin (Bariatric Advantage has adequate B1 in it). Make take every other day due to your levels     Vitamin B12 (Lifetime)   1000 mcg DAILY of Vitamin B12   All Vitamin B12 must be taken sublingually (under your tongue)  May once weekly due to your levels     Iron  *Required for menstruating women OR all patients that have a history of low iron*   Recommended to take 500 mg of Vitamin C chewable 30 minutes prior to taking the iron to help with absorption   Avoid taking with calcium supplements. (Calcium inhibits the absorption of iron)   We recommend going to Bariatric Advantages Website and getting their iron. (www.bariatricadGystage. com) (The lemon-lime has 60mg of iron)    OTC iron is a dosage of 65 mg        Katie Braswell  Phone: [phone]  Fax: [phone]  Address:  [address] [place] [address], [address] Renny Braswell received her Masters degree in clinical mental health counseling from [employer]. She completed her internship and residency training with a variety of experiences including inpatient, residential and outpatient counseling with adults and adolescents. She specializes in treating adults and adolescents for depression, anxiety, behavioral disorders, grief and life transitions. Davida Castleman sees patients 6 and older.

## 2020-01-24 ENCOUNTER — TELEPHONE (OUTPATIENT)
Dept: SURGERY | Age: 40
End: 2020-01-24

## 2020-01-24 NOTE — TELEPHONE ENCOUNTER
Received msg from patient on nurse line wishing to inform NP Veverly Ziggy that [employer] is no longer with the group that NP recommended.     Will pass this msg along to NP.

## 2020-06-22 ENCOUNTER — IMPORTED ENCOUNTER (OUTPATIENT)
Dept: URBAN - METROPOLITAN AREA CLINIC 1 | Facility: CLINIC | Age: 40
End: 2020-06-22

## 2020-06-22 PROBLEM — H44.23: Noted: 2020-06-22

## 2020-06-22 PROCEDURE — S0621 ROUTINE OPHTHALMOLOGICAL EXA: HCPCS

## 2020-06-22 NOTE — PATIENT DISCUSSION
1. Myopia: Rx was given for correction if indicated and requested. 2. Allergic Conjunctivitis OU - OTC Zaditor BID OU/Pataday QD OU PRN itching 3. H/o Pseudotumor Cerebri Finalized CTL Kendra Jo for an appointment in 1 year 40/cc with Dr. Jessica Conteh.

## 2020-11-10 ENCOUNTER — DOCUMENTATION ONLY (OUTPATIENT)
Dept: SURGERY | Age: 40
End: 2020-11-10

## 2020-11-10 NOTE — PROGRESS NOTES
Per [facility] requirements;  E-mail and letter sent for follow up appointment. [facility]  SALIMA/Eleazar Almeida. [address] [facility] Weight Loss [place]   [facility]  [facility]        Dear Patient,        Your health is our main concern. It is important for your health to have follow-up lab work and to see your surgeon at 3 months, 6 months and annually after your weight loss surgery. Additionally, the Department of Bariatric Surgery at our hospital is a member of the [employer] of [address] [place] [address] Surgical Quality Improvement Program ([facility] NSQIP). As a participant in this program, we gather information on the outcomes of our patients after surgery. Please call the office for a follow up appointment at [phone]. If you have moved out of the area or have changed surgeons please call us and let us know the name of your doctor. Your health and feedback are important to us. We greatly appreciate your response.        Thank you,  [facility] Loss [address] [facility]
Yes

## 2021-08-04 ENCOUNTER — IMPORTED ENCOUNTER (OUTPATIENT)
Dept: URBAN - METROPOLITAN AREA CLINIC 1 | Facility: CLINIC | Age: 41
End: 2021-08-04

## 2021-08-04 PROBLEM — H44.23: Noted: 2021-08-04

## 2021-08-04 PROCEDURE — S0621 ROUTINE OPHTHALMOLOGICAL EXA: HCPCS

## 2021-08-04 NOTE — PATIENT DISCUSSION
Glaucoma Suspect OU (CD 0.60/0.55) - IOP stable. Negative family hx. Will have patient return for baseline work up. 3.  Iris Synechiae OD - Observe. No h/o Iritis per patient4.   H/o Pseudotumor Cerebri

## 2021-08-04 NOTE — PATIENT DISCUSSION
1. Myopia: Rx was given for correction if indicated and requested. 2. Glaucoma Suspect OU (CD 0.60/0.55) - IOP stable. Negative family hx. Will have patient return for baseline work up. 3.  Iris Synechiae OD - Observe. No h/o Iritis per patient4. Allergic Conjunctivitis OU - May use OTC Pataday QD OU PRN for itching 5. H/o Pseudotumor CerebriFinalized CTL RX Return for an appointment in 1 month 10/OCT/HVF with Dr. Dina Giordano. Return for an appointment in 1 year 40/cc with Dr. Dina Giordano.

## 2021-09-08 ENCOUNTER — IMPORTED ENCOUNTER (OUTPATIENT)
Dept: URBAN - METROPOLITAN AREA CLINIC 1 | Facility: CLINIC | Age: 41
End: 2021-09-08

## 2021-09-08 PROBLEM — H40.1131: Noted: 2021-09-08

## 2021-09-08 PROCEDURE — 92012 INTRM OPH EXAM EST PATIENT: CPT

## 2021-09-08 PROCEDURE — 92133 CPTRZD OPH DX IMG PST SGM ON: CPT

## 2021-09-08 PROCEDURE — 92083 EXTENDED VISUAL FIELD XM: CPT

## 2021-09-08 NOTE — PATIENT DISCUSSION
1.  Mild Open Angle Glaucoma OU -- *New Dx* (CD: 0.60/0.55) IOP elevated at 17/16. Goal Range 12-14 OU. Begin Lumigan QHS OU (Sample given). Baseline OCT shows moderate thinning OU. Baseline VF shows possible early superior Arcuate OD & Inferior Arcuate OS. Patient to continue with current gtt regimen. Patient advised to be compliant with gtts. Condition was discussed with patient and patient understands. Will continue to monitor patient for any progression in condition. Patient was advised to call us with any problems questions or concerns. 2.  Iris Synechiae OD -- Observe. No h/o Iritis per patient3. Allergic Conjunctivitis OU -- Recommended OTC Pataday QD OU PRN itching. 4.  H/o Pseudotumor CerebriReturn for an appointment in 3 weeks 10/IOP Check with Dr. Maravilla.

## 2021-09-08 NOTE — PATIENT DISCUSSION
Iris Synechiae OD - Observe. No h/o Iritis per patient3. Allergic Conjunctivitis OU -- Recommended OTC Pataday QD OU PRN itching. 4.  H/o Pseudotumor CerebriReturn for an appointment in 3 weeks 10/IOP Check with Dr. Chano Ortiz.

## 2021-10-01 ENCOUNTER — IMPORTED ENCOUNTER (OUTPATIENT)
Dept: URBAN - METROPOLITAN AREA CLINIC 1 | Facility: CLINIC | Age: 41
End: 2021-10-01

## 2021-10-01 PROBLEM — H40.1131: Noted: 2021-10-01

## 2021-10-01 PROCEDURE — 92012 INTRM OPH EXAM EST PATIENT: CPT

## 2021-10-01 NOTE — PATIENT DISCUSSION
1.  Mild Open Angle Glaucoma OU -- (CD: 0.60/0.55) IOP stable at 13 OU. Goal Range 12-14 OU. Cont Lumigan QHS OU (Erx'd & coupon given). Baseline OCT shows moderate thinning OU. Baseline VF shows possible early superior Arcuate OD & Inferior Arcuate OS. Patient to continue with current gtt regimen. Patient advised to be compliant with gtts. Condition was discussed with patient and patient understands. Will continue to monitor patient for any progression in condition. Patient was advised to call us with any problems questions or concerns. 2.  Iris Synechiae OD -- Observe. No h/o Iritis per patient3. Allergic Conjunctivitis OU -- Recommended OTC Pataday QD OU PRN itching. 4.  H/o Pseudotumor CerebriReturn for an appointment in 4 months 10/IOP Check with Dr. Kadie Tinoco.

## 2021-11-02 ENCOUNTER — DOCUMENTATION ONLY (OUTPATIENT)
Dept: SURGERY | Age: 41
End: 2021-11-02

## 2021-11-02 NOTE — PROGRESS NOTES
Per [facility] requirements;  E-mail and letter sent for follow up appointment. [facility] Loss [address] 89      Dear Patient,    Your health is our main concern. It is important for your health to have follow-up lab work and to see your surgeon at 3 months, 6 months and annually after your weight loss surgery. Additionally, the Department of bariatric Surgery at our hospital is a member of the Metabolic and Bariatric Surgery Accreditation and Quality Improvement Program [facility]). As a participant in this program, we gather information on the outcomes of our patients after surgery. Please call the office for a follow up appointment at [phone] \[facility]\"") or [phone] [facility]). If you have moved out of the area or have changed surgeons please call us and let us know the name of your doctor. Your health and feedback are important to us. We greatly appreciate your response.        Thank you,  [facility] Loss [address] [facility]

## 2022-02-01 ENCOUNTER — IMPORTED ENCOUNTER (OUTPATIENT)
Dept: URBAN - METROPOLITAN AREA CLINIC 1 | Facility: CLINIC | Age: 42
End: 2022-02-01

## 2022-02-01 PROBLEM — H40.1131: Noted: 2022-02-01

## 2022-02-01 PROCEDURE — 99213 OFFICE O/P EST LOW 20 MIN: CPT

## 2022-02-01 NOTE — PATIENT DISCUSSION
1.  Mild Open Angle Glaucoma OU -- (CD: 0.60/0.55) IOP stable at 14 OU. Goal Range 12-14 OU. Continue Latanoprost QHS OU. Patient to continue with current gtt regimen. Patient advised to be compliant with gtts. Condition was discussed with patient and patient understands. Will continue to monitor patient for any progression in condition. Patient was advised to call us with any problems questions or concerns. (Lumigan not covered by insurance pt ok to continue on Latanoprost). 2. Iris Synechiae OD -- Observe. No h/o Iritis per patient3. Allergic Conjunctivitis OU -- Recommended OTC Pataday QD OU PRN itching. 4.  H/o Pseudotumor CerebriReturn for an appointment in Tor[place] 30/OCT with Dr. Francisco Bhatti.

## 2022-03-19 PROBLEM — E66.01 MORBID OBESITY DUE TO EXCESS CALORIES (HCC): Status: ACTIVE | Noted: 2017-12-11

## 2022-04-02 ASSESSMENT — TONOMETRY
OD_IOP_MMHG: 18
OS_IOP_MMHG: 17
OD_IOP_MMHG: 17
OD_IOP_MMHG: 14
OD_IOP_MMHG: 16
OS_IOP_MMHG: 16
OD_IOP_MMHG: 16
OS_IOP_MMHG: 14
OS_IOP_MMHG: 16
OS_IOP_MMHG: 16
OD_IOP_MMHG: 17
OS_IOP_MMHG: 13
OD_IOP_MMHG: 13
OS_IOP_MMHG: 17

## 2022-04-02 ASSESSMENT — VISUAL ACUITY
OD_SC: 20/20
OD_SC: 20/20-2
OS_CC: J1+
OS_CC: J1+
OD_CC: J1
OD_CC: J1+
OD_SC: 20/20
OS_SC: 20/20
OD_SC: 20/20
OD_SC: 20/20
OS_SC: 20/25
OD_SC: 20/20
OD_SC: 20/20
OS_SC: 20/20
OD_SC: 20/20
OS_SC: 20/20-2
OS_SC: 20/25
OD_CC: J1+
OS_CC: J1
OS_SC: 20/20
OS_SC: 20/20
OS_SC: 20/25

## 2022-04-02 ASSESSMENT — KERATOMETRY
OS_AXISANGLE2_DEGREES: 084
OS_AXISANGLE_DEGREES: 174
OD_AXISANGLE2_DEGREES: 104
OS_K2POWER_DIOPTERS: 46.25
OD_K2POWER_DIOPTERS: 44.75
OD_AXISANGLE_DEGREES: 014
OS_K1POWER_DIOPTERS: 44.75
OD_K1POWER_DIOPTERS: 44.50

## 2022-06-27 ENCOUNTER — COMPREHENSIVE EXAM (OUTPATIENT)
Dept: URBAN - METROPOLITAN AREA CLINIC 1 | Facility: CLINIC | Age: 42
End: 2022-06-27

## 2022-06-27 DIAGNOSIS — H10.403: ICD-10-CM

## 2022-06-27 DIAGNOSIS — H21.501: ICD-10-CM

## 2022-06-27 DIAGNOSIS — H40.1131: ICD-10-CM

## 2022-06-27 DIAGNOSIS — H04.123: ICD-10-CM

## 2022-06-27 DIAGNOSIS — H16.143: ICD-10-CM

## 2022-06-27 PROCEDURE — 92133 CPTRZD OPH DX IMG PST SGM ON: CPT

## 2022-06-27 PROCEDURE — 92014 COMPRE OPH EXAM EST PT 1/>: CPT

## 2022-06-27 ASSESSMENT — VISUAL ACUITY
OS_CC: J1+
OD_CC: 20/25
OS_CC: 20/25-1
OD_CC: J1+

## 2022-06-27 ASSESSMENT — TONOMETRY
OD_IOP_MMHG: 13
OS_IOP_MMHG: 13

## 2022-06-27 NOTE — PATIENT DISCUSSION
(CD: 0.60/0.55) OCT show no progression today.  IOP stable at 13 OU. Goal Range 12-14 OU. Continue Latanoprost QHS OU. Patient to continue with current gtt regimen. Patient advised to be compliant with gtts. Condition was discussed with patient and patient understands. Will continue to monitor patient for any progression in condition. Patient was advised to call us with any problems questions or concerns. (Lumigan not covered by insurance pt ok to continue on Latanoprost). Will return in 6 months for 10/HVF.

## 2022-08-26 ENCOUNTER — COMPREHENSIVE EXAM (OUTPATIENT)
Dept: URBAN - METROPOLITAN AREA CLINIC 1 | Facility: CLINIC | Age: 42
End: 2022-08-26

## 2022-08-26 DIAGNOSIS — H52.13: ICD-10-CM

## 2022-08-26 DIAGNOSIS — Z46.0: ICD-10-CM

## 2022-08-26 DIAGNOSIS — H52.223: ICD-10-CM

## 2022-08-26 DIAGNOSIS — Z01.00: ICD-10-CM

## 2022-08-26 PROCEDURE — 92015 DETERMINE REFRACTIVE STATE: CPT

## 2022-08-26 PROCEDURE — 92310 CONTACT LENS FITTING OU: CPT

## 2022-08-26 PROCEDURE — 92014 COMPRE OPH EXAM EST PT 1/>: CPT

## 2022-08-26 ASSESSMENT — VISUAL ACUITY
OD_CC: 20/30
OS_CC: J1+
OS_CC: 20/20
OD_CC: 20/30
OD_CC: J1
OS_CC: J1
OS_CC: 20/20
OD_CC: J1+

## 2022-08-26 ASSESSMENT — TONOMETRY
OS_IOP_MMHG: 13
OD_IOP_MMHG: 14

## 2022-08-26 NOTE — PATIENT DISCUSSION
(CD: 0.60/0.55) OCT show no progression today.  IOP stable at 13 OU. Goal Range 12-14 OU. Continue Latanoprost QHS OU. Patient to continue with current gtt regimen. Patient advised to be compliant with gtts. Condition was discussed with patient and patient understands. Will continue to monitor patient for any progression in condition. Patient was advised to call us with any problems questions or concerns. (Lumigan not covered by insurance pt ok to continue on Latanoprost). Patient will return in Dec. for 10/HVF.

## 2023-01-31 RX ORDER — METHOCARBAMOL 500 MG/1
TABLET, FILM COATED ORAL 4 TIMES DAILY
COMMUNITY

## 2023-01-31 RX ORDER — ACETAMINOPHEN 325 MG/1
TABLET ORAL EVERY 4 HOURS PRN
COMMUNITY

## 2023-01-31 RX ORDER — TRAMADOL HYDROCHLORIDE 50 MG/1
TABLET ORAL
COMMUNITY
Start: 2019-11-04

## 2023-06-16 ENCOUNTER — COMPREHENSIVE EXAM (OUTPATIENT)
Dept: URBAN - METROPOLITAN AREA CLINIC 1 | Facility: CLINIC | Age: 43
End: 2023-06-16

## 2023-06-16 DIAGNOSIS — H10.45: ICD-10-CM

## 2023-06-16 DIAGNOSIS — H16.143: ICD-10-CM

## 2023-06-16 DIAGNOSIS — H04.123: ICD-10-CM

## 2023-06-16 DIAGNOSIS — H40.1131: ICD-10-CM

## 2023-06-16 PROCEDURE — 92133 CPTRZD OPH DX IMG PST SGM ON: CPT

## 2023-06-16 PROCEDURE — 92014 COMPRE OPH EXAM EST PT 1/>: CPT

## 2023-06-16 RX ORDER — OLOPATADINE HYDROCHLORIDE 2 MG/ML: 1 SOLUTION OPHTHALMIC AS NEEDED

## 2023-06-16 ASSESSMENT — TONOMETRY
OD_IOP_MMHG: 14
OS_IOP_MMHG: 13

## 2023-06-16 ASSESSMENT — VISUAL ACUITY
OS_CC: 20/20
OD_CC: 20/20

## 2023-09-15 ENCOUNTER — COMPREHENSIVE EXAM (OUTPATIENT)
Dept: URBAN - METROPOLITAN AREA CLINIC 1 | Facility: CLINIC | Age: 43
End: 2023-09-15

## 2023-09-15 DIAGNOSIS — H52.4: ICD-10-CM

## 2023-09-15 DIAGNOSIS — Z01.00: ICD-10-CM

## 2023-09-15 DIAGNOSIS — H52.13: ICD-10-CM

## 2023-09-15 DIAGNOSIS — Z46.0: ICD-10-CM

## 2023-09-15 DIAGNOSIS — H52.223: ICD-10-CM

## 2023-09-15 PROCEDURE — 92015 DETERMINE REFRACTIVE STATE: CPT

## 2023-09-15 PROCEDURE — 92310-3 LEVEL 3 CONTACT LENS MANAGEMENT

## 2023-09-15 PROCEDURE — 92014 COMPRE OPH EXAM EST PT 1/>: CPT

## 2023-09-15 ASSESSMENT — KERATOMETRY
OS_K1POWER_DIOPTERS: 45.50
OD_K1POWER_DIOPTERS: 44.50
OD_AXISANGLE2_DEGREES: 135
OS_AXISANGLE_DEGREES: 174
OS_AXISANGLE2_DEGREES: 84
OD_AXISANGLE_DEGREES: 045
OS_K2POWER_DIOPTERS: 45.00
OD_K2POWER_DIOPTERS: 45.00

## 2023-09-15 ASSESSMENT — VISUAL ACUITY
OD_CC: 20/20-1
OS_CC: 20/20-1

## 2023-09-15 ASSESSMENT — TONOMETRY
OS_IOP_MMHG: 14
OD_IOP_MMHG: 13

## 2024-01-16 ENCOUNTER — FOLLOW UP (OUTPATIENT)
Dept: URBAN - METROPOLITAN AREA CLINIC 1 | Facility: CLINIC | Age: 44
End: 2024-01-16

## 2024-01-16 DIAGNOSIS — H40.1131: ICD-10-CM

## 2024-01-16 PROCEDURE — 92012 INTRM OPH EXAM EST PATIENT: CPT

## 2024-01-16 PROCEDURE — 92083 EXTENDED VISUAL FIELD XM: CPT

## 2024-01-16 ASSESSMENT — VISUAL ACUITY
OS_CC: 20/30
OD_CC: 20/25

## 2024-01-16 ASSESSMENT — KERATOMETRY
OS_K2POWER_DIOPTERS: 45.00
OS_K1POWER_DIOPTERS: 45.50
OS_AXISANGLE2_DEGREES: 84
OD_K1POWER_DIOPTERS: 44.50
OD_AXISANGLE2_DEGREES: 135
OD_AXISANGLE_DEGREES: 045
OS_AXISANGLE_DEGREES: 174
OD_K2POWER_DIOPTERS: 45.00

## 2024-01-16 ASSESSMENT — TONOMETRY
OD_IOP_MMHG: 14
OS_IOP_MMHG: 14

## 2024-05-13 ENCOUNTER — COMPREHENSIVE EXAM (OUTPATIENT)
Dept: URBAN - METROPOLITAN AREA CLINIC 1 | Facility: CLINIC | Age: 44
End: 2024-05-13

## 2024-05-13 DIAGNOSIS — H04.123: ICD-10-CM

## 2024-05-13 DIAGNOSIS — H40.1131: ICD-10-CM

## 2024-05-13 DIAGNOSIS — H16.143: ICD-10-CM

## 2024-05-13 PROCEDURE — 92014 COMPRE OPH EXAM EST PT 1/>: CPT

## 2024-05-13 PROCEDURE — 92133 CPTRZD OPH DX IMG PST SGM ON: CPT

## 2024-05-13 RX ORDER — OLOPATADINE HYDROCHLORIDE 2 MG/ML: 1 SOLUTION OPHTHALMIC AS NEEDED

## 2024-05-13 ASSESSMENT — KERATOMETRY
OD_AXISANGLE_DEGREES: 045
OD_AXISANGLE2_DEGREES: 135
OS_K1POWER_DIOPTERS: 45.50
OS_K2POWER_DIOPTERS: 45.00
OS_AXISANGLE2_DEGREES: 84
OD_K1POWER_DIOPTERS: 44.50
OS_AXISANGLE_DEGREES: 174
OD_K2POWER_DIOPTERS: 45.00

## 2024-05-13 ASSESSMENT — TONOMETRY
OD_IOP_MMHG: 14
OS_IOP_MMHG: 15

## 2024-05-13 ASSESSMENT — VISUAL ACUITY
OD_CC: J2
OD_CC: 20/20
OS_CC: J2
OS_CC: 20/20

## 2024-09-16 ENCOUNTER — COMPREHENSIVE EXAM (OUTPATIENT)
Dept: URBAN - METROPOLITAN AREA CLINIC 1 | Facility: CLINIC | Age: 44
End: 2024-09-16

## 2024-09-16 DIAGNOSIS — H52.4: ICD-10-CM

## 2024-09-16 DIAGNOSIS — H52.223: ICD-10-CM

## 2024-09-16 DIAGNOSIS — Z01.00: ICD-10-CM

## 2024-09-16 DIAGNOSIS — H52.13: ICD-10-CM

## 2024-09-16 DIAGNOSIS — Z46.0: ICD-10-CM

## 2024-09-16 PROCEDURE — 92015 DETERMINE REFRACTIVE STATE: CPT

## 2024-09-16 PROCEDURE — 92014 COMPRE OPH EXAM EST PT 1/>: CPT

## 2024-09-16 PROCEDURE — 92310-3 LEVEL 3 CONTACT LENS MANAGEMENT

## 2025-01-21 ENCOUNTER — FOLLOW UP (OUTPATIENT)
Age: 45
End: 2025-01-21

## 2025-01-21 DIAGNOSIS — H40.1131: ICD-10-CM

## 2025-01-21 PROCEDURE — 92012 INTRM OPH EXAM EST PATIENT: CPT

## 2025-01-21 PROCEDURE — 92083 EXTENDED VISUAL FIELD XM: CPT

## 2025-05-12 ENCOUNTER — COMPREHENSIVE EXAM (OUTPATIENT)
Age: 45
End: 2025-05-12

## 2025-05-12 DIAGNOSIS — H40.1131: ICD-10-CM

## 2025-05-12 DIAGNOSIS — H04.123: ICD-10-CM

## 2025-05-12 DIAGNOSIS — H16.143: ICD-10-CM

## 2025-05-12 DIAGNOSIS — H10.45: ICD-10-CM

## 2025-05-12 PROCEDURE — 92133 CPTRZD OPH DX IMG PST SGM ON: CPT

## 2025-05-12 PROCEDURE — 92014 COMPRE OPH EXAM EST PT 1/>: CPT

## (undated) DEVICE — BLADELESS OPTICAL TROCAR WITH FIXATION CANNULA: Brand: VERSAONE

## (undated) DEVICE — SOLUTION IV STRL H2O 500 ML AQUALITE POUR BTL

## (undated) DEVICE — STAPLE INT BIOABSORABLE LN REINF 29 PROX 29 ETHICON SEAMGRD

## (undated) DEVICE — REM POLYHESIVE ADULT PATIENT RETURN ELECTRODE: Brand: VALLEYLAB

## (undated) DEVICE — BLADELESS OPTICAL TROCAR WITH FIXATION CANNULA: Brand: VERSAPORT

## (undated) DEVICE — RELOAD STPL L60MM H1-2.6MM MESENTERY THN TISS WHT 6 ROW

## (undated) DEVICE — SUTURE MCRYL SZ 4-0 L27IN ABSRB UD L24MM PS-1 3/8 CIR PRIM Y935H

## (undated) DEVICE — SHEARS: Brand: ENDO SHEARS

## (undated) DEVICE — UNIVERSAL FIXATION CANNULA: Brand: VERSAONE

## (undated) DEVICE — SUTURE VCRL SZ 3-0 L27IN ABSRB VLT L26MM SH 1/2 CIR J316H

## (undated) DEVICE — SUTURE VCRL SZ 2-0 L54IN ABSRB VLT W/O NDL POLYGLACTIN 910 J618H

## (undated) DEVICE — VISUALIZATION SYSTEM: Brand: CLEARIFY

## (undated) DEVICE — KIT CATH OD16FR 5ML BLLN SIL URIN INDWL STR TIP INF CTRL

## (undated) DEVICE — RELOAD STPL L60MM H1.5-3.6MM REG TISS BLU GRIPPING SURF B

## (undated) DEVICE — KENDALL SCD EXPRESS SLEEVES, KNEE LENGTH, MEDIUM: Brand: KENDALL SCD

## (undated) DEVICE — INTENDED FOR TISSUE SEPARATION, AND OTHER PROCEDURES THAT REQUIRE A SHARP SURGICAL BLADE TO PUNCTURE OR CUT.: Brand: BARD-PARKER ® CARBON RIB-BACK BLADES

## (undated) DEVICE — SUT SLK 2-0SH 30IN BLK --

## (undated) DEVICE — TRUE CONTENT TO BE POPULATED AS PART OF REBRANDING: Brand: ARGYLE

## (undated) DEVICE — (D)CANN ENDOSCP BLDELSS 12MM -- DISC BY MFR USE ITEM 332253

## (undated) DEVICE — INSUFFLATION NEEDLE: Brand: SURGINEEDLE

## (undated) DEVICE — LIGHT HANDLE: Brand: DEVON

## (undated) DEVICE — STAPLER SKIN L440MM 32MM LNG 12 FIRING B FRM PWR + GRIPPING

## (undated) DEVICE — DISPOSABLE SUCTION/IRRIGATOR TUBE SET WITH TIP: Brand: AHTO

## (undated) DEVICE — PACK PROCEDURE SURG LAPAROSCOPY 17X7 MM BRTRC PRIMUS